# Patient Record
Sex: FEMALE | Race: WHITE | NOT HISPANIC OR LATINO | Employment: OTHER | URBAN - METROPOLITAN AREA
[De-identification: names, ages, dates, MRNs, and addresses within clinical notes are randomized per-mention and may not be internally consistent; named-entity substitution may affect disease eponyms.]

---

## 2022-06-09 ENCOUNTER — EVALUATION (OUTPATIENT)
Dept: PHYSICAL THERAPY | Facility: REHABILITATION | Age: 67
End: 2022-06-09
Payer: MEDICARE

## 2022-06-09 DIAGNOSIS — Z47.1 AFTERCARE FOLLOWING RIGHT KNEE JOINT REPLACEMENT SURGERY: ICD-10-CM

## 2022-06-09 DIAGNOSIS — M25.561 RIGHT KNEE PAIN, UNSPECIFIED CHRONICITY: Primary | ICD-10-CM

## 2022-06-09 DIAGNOSIS — Z96.651 AFTERCARE FOLLOWING RIGHT KNEE JOINT REPLACEMENT SURGERY: ICD-10-CM

## 2022-06-09 PROCEDURE — 97161 PT EVAL LOW COMPLEX 20 MIN: CPT | Performed by: PHYSICAL THERAPIST

## 2022-06-09 PROCEDURE — 97140 MANUAL THERAPY 1/> REGIONS: CPT | Performed by: PHYSICAL THERAPIST

## 2022-06-09 NOTE — PROGRESS NOTES
PT Evaluation     Today's date: 2022  Patient name: Angela Williamson  : 1955  MRN: [id number]  Referring provider: Juan Cohen MD  Dx:   Encounter Diagnosis     ICD-10-CM    1  Aftercare following right knee joint replacement surgery  Z47 1     Z96 651    2  Right knee pain, unspecified chronicity  M25 561                   Assessment  Assessment details: 2022  Pt is a pleasant 75 yo female presenting to physical therapy s/p R TKA on 2020  Pt is PWB utilizing a RW  Pt displays difficulty ambulating as well as STS  Pt displayed good A/PROM in knee flexion and a limitation in A/PROM in knee extension   Pt displayed weakness in R quad  Pt was given exercises to complete at home including heel slides, quad sets, hamstring stretch, heel/toe raises, and squats on a countertop  These exercises were provided in a HEP  Pt was educated on safe STS and displayed knowledge on climbing stairs  Pt's RW was adjusted to properly fit  Pt would benefit from skilled PT to address limitations in pain management, ROM, and Strength, and to achieve goals  Impairments: abnormal coordination, abnormal gait, abnormal muscle firing, abnormal or restricted ROM, activity intolerance, impaired balance, impaired physical strength, lacks appropriate home exercise program, pain with function and weight-bearing intolerance  Understanding of Dx/Px/POC: good   Prognosis: good    Goals  ST  Patient will report 25% decrease in pain with dressing in 4 weeks  2  Patient will demonstrate ROM knee extension of <10 from 0 to normalize gait pattern with SPC in 4 weeks  3  Patient will demonstrate grade 3+ in quad strength in 4 weeks  LT  Patient will be able to perform IADLS without restriction or pain by discharge  2  Patient will be independent in HEP by discharge  3  Patient will be able to return to walking the length of a block independently without assistive device by discharge         Plan  Patient would benefit from: PT eval and skilled physical therapy  Planned modality interventions: cryotherapy and electrical stimulation/Russian stimulation  Planned therapy interventions: ADL retraining, balance/weight bearing training, strengthening, stretching, self care, gait training, functional ROM exercises, flexibility, home exercise program, IADL retraining, manual therapy, neuromuscular re-education, patient education, transfer training, therapeutic training, therapeutic exercise and therapeutic activities  Frequency: 2x week  Duration in visits: 8  Duration in weeks: 4  Plan of Care beginning date: 2022  Plan of Care expiration date: 2022  Treatment plan discussed with: patient        Subjective Evaluation    History of Present Illness  Mechanism of injury: 22  Dylan Muniz is 73 yo female, who presents to physical therapy s/p R TKA on 2022  Pt presents ambulating on RW with bandage strip covering incision  Pt stated she had a "burning stabbing pain post surgery that has gone down in the last day"  Pt states she has had Knee OA which she describes as "bone on bone" and had 5 years of Cortisone injections as well as therapy prior to surgery  Pt lives in a 2 story house with her  where she has avoided using the stairs due to fear of pain  She has 1 step to enter the house by denies difficulty from the past two days  Pt states that her  has been helping her with ADLs  Pt states she is in moderate pain and has had difficulty sleeping due to pain and being unable to rest in a comfortable position  Pt states she will see the PA-C at 3 weeks and surgeon at 6 weeks  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  Denies fever or chills  PMH: hypertension   AGG: changing positions, sit to stand, walking   REL: ice, Tylenol "on the clock"   GOAL: Pt states she would like to reduce her pain to 5-6/10 and be able to go on walks again     Pain  Current pain ratin  At worst pain rating: 10    Patient Goals  Patient goals for therapy: decreased pain, increased motion, increased strength, improved balance, decreased edema, independence with ADLs/IADLs and return to sport/leisure activities          Objective     Active Range of Motion     Right Knee   Flexion: 60 degrees   Extensor la degrees     Passive Range of Motion     Right Knee   Flexion: 90 degrees   Extension: 20 degrees     Strength/Myotome Testing     Left Knee   Extension: 5    Right Knee   Extension: 3-    Tests     Additional Tests Details  22  Unable to assess incision due to post-op bandage intact  To be removed this weekend  Swelling     Right Knee Girth Measurement (cm)   Joint line: 49 5 cm  10 cm above joint line: 57 5 cm  10 cm below joint line: 46 8 cm    Ambulation   Weight-Bearing Status   Weight-Bearing Status (Left): partial weight-bearing   Assistive device used: two-wheeled walker    Additional Weight-Bearing Status Details  2022  Pt presents PWB, with step-to pattern, on RW which was set too high - modified in clinic to appropriate height  Pt ambulates with a forward trunk lean and slow gait  Pt was able to squat shelter while using the support of a countertop  Pt displayed difficulty sit to stand  Pt was knowledgeable on safe stair climbing practices, but displayed confusion with standing from a chair  Pt was able to heel raise with minimal pain/difficulty, with assistance of UEs                 Precautions: HTN    Daily Treatment Diary    Date             FOTO IE            Re-Eval IE               Manuals    PROM flex/ext MR            Patellar mobs             PROM hip                          Neuro Re-Ed                                                                                                Ther Ex                 Mini squats x5            Stand hip abd, ext             HR/TR x5            Standing gastroc stretch             Knee flex/ext stretch on step Quad sets              [employer]                          Ther Activity    bike ROM                         Gait Training                              Modalities    CP PRN

## 2022-06-09 NOTE — LETTER
2022    MD Stacy Norris [phone]    Patient: Mika Ordonez   YOB: 1955   Date of Visit: 2022     Encounter Diagnosis     ICD-10-CM    1  Right knee pain, unspecified chronicity  M25 561    2  Aftercare following right knee joint replacement surgery  Z47 1     Z96 651        Dear Dr Yesy Gordon: Thank you for your recent referral of Mika Ordonez  Please review the attached evaluation summary from Jesika's recent visit  Please verify that you agree with the plan of care by signing the attached order  If you have any questions or concerns, please do not hesitate to call  I sincerely appreciate the opportunity to share in the care of one of your patients and hope to have another opportunity to work with you in the near future  Sincerely,    Tanya Serna, PT      Referring Provider:      I certify that I have read the below Plan of Care and certify the need for these services furnished under this plan of treatment while under my care  MD Stacy Norris [phone]  Via Fax: [phone]          PT Evaluation     Today's date: 2022  Patient name: Mika Ordonez  : 1955  MRN: [id number]  Referring provider: Hattie Schaumann, MD  Dx:   Encounter Diagnosis     ICD-10-CM    1  Aftercare following right knee joint replacement surgery  Z47 1     Z96 651    2  Right knee pain, unspecified chronicity  M25 561                   Assessment  Assessment details: 2022  Pt is a pleasant 75 yo female presenting to physical therapy s/p R TKA on 2020  Pt is PWB utilizing a RW  Pt displays difficulty ambulating as well as STS  Pt displayed good A/PROM in knee flexion and a limitation in A/PROM in knee extension   Pt displayed weakness in R quad   Pt was given exercises to complete at home including heel slides, quad sets, hamstring stretch, heel/toe raises, and squats on a countertop  These exercises were provided in a HEP  Pt was educated on safe STS and displayed knowledge on climbing stairs  Pt's RW was adjusted to properly fit  Pt would benefit from skilled PT to address limitations in pain management, ROM, and Strength, and to achieve goals  Impairments: abnormal coordination, abnormal gait, abnormal muscle firing, abnormal or restricted ROM, activity intolerance, impaired balance, impaired physical strength, lacks appropriate home exercise program, pain with function and weight-bearing intolerance  Understanding of Dx/Px/POC: good   Prognosis: good    Goals  ST  Patient will report 25% decrease in pain with dressing in 4 weeks  2  Patient will demonstrate ROM knee extension of <10 from 0 to normalize gait pattern with SPC in 4 weeks  3  Patient will demonstrate grade 3+ in quad strength in 4 weeks  LT  Patient will be able to perform IADLS without restriction or pain by discharge  2  Patient will be independent in HEP by discharge  3  Patient will be able to return to walking the length of a block independently without assistive device by discharge         Plan  Patient would benefit from: PT eval and skilled physical therapy  Planned modality interventions: cryotherapy and electrical stimulation/Russian stimulation  Planned therapy interventions: ADL retraining, balance/weight bearing training, strengthening, stretching, self care, gait training, functional ROM exercises, flexibility, home exercise program, IADL retraining, manual therapy, neuromuscular re-education, patient education, transfer training, therapeutic training, therapeutic exercise and therapeutic activities  Frequency: 2x week  Duration in visits: 8  Duration in weeks: 4  Plan of Care beginning date: 2022  Plan of Care expiration date: 2022  Treatment plan discussed with: patient        Subjective Evaluation    History of Present Illness  Mechanism of injury: 22  Jennie Cassidy is 73 yo female, who presents to physical therapy s/p R TKA on 2022  Pt presents ambulating on RW with bandage strip covering incision  Pt stated she had a "burning stabbing pain post surgery that has gone down in the last day"  Pt states she has had Knee OA which she describes as "bone on bone" and had 5 years of Cortisone injections as well as therapy prior to surgery  Pt lives in a 2 story house with her  where she has avoided using the stairs due to fear of pain  She has 1 step to enter the house by denies difficulty from the past two days  Pt states that her  has been helping her with ADLs  Pt states she is in moderate pain and has had difficulty sleeping due to pain and being unable to rest in a comfortable position  Pt states she will see the PA-C at 3 weeks and surgeon at 6 weeks  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  Denies fever or chills  PMH: hypertension   AGG: changing positions, sit to stand, walking   REL: ice, Tylenol "on the clock"   GOAL: Pt states she would like to reduce her pain to 5-6/10 and be able to go on walks again  Pain  Current pain ratin  At worst pain rating: 10    Patient Goals  Patient goals for therapy: decreased pain, increased motion, increased strength, improved balance, decreased edema, independence with ADLs/IADLs and return to sport/leisure activities          Objective     Active Range of Motion     Right Knee   Flexion: 60 degrees   Extensor la degrees     Passive Range of Motion     Right Knee   Flexion: 90 degrees   Extension: 20 degrees     Strength/Myotome Testing     Left Knee   Extension: 5    Right Knee   Extension: 3-    Tests     Additional Tests Details  22  Unable to assess incision due to post-op bandage intact  To be removed this weekend       Swelling     Right Knee Girth Measurement (cm)   Joint line: 49 5 cm  10 cm above joint line: 57 5 cm  10 cm below joint line: 46 8 cm    Ambulation Weight-Bearing Status   Weight-Bearing Status (Left): partial weight-bearing   Assistive device used: two-wheeled walker    Additional Weight-Bearing Status Details  6/9/2022  Pt presents PWB, with step-to pattern, on RW which was set too high - modified in clinic to appropriate height  Pt ambulates with a forward trunk lean and slow gait  Pt was able to squat USP while using the support of a countertop  Pt displayed difficulty sit to stand  Pt was knowledgeable on safe stair climbing practices, but displayed confusion with standing from a chair  Pt was able to heel raise with minimal pain/difficulty, with assistance of UEs                 Precautions: HTN    Daily Treatment Diary    Date 6/9            FOTO IE            Re-Eval IE               Manuals    PROM flex/ext MR            Patellar mobs             PROM hip                          Neuro Re-Ed                                                                                                Ther Ex                 Mini squats x5            Stand hip abd, ext             HR/TR x5            Standing gastroc stretch             Knee flex/ext stretch on step                                       Quad sets              Heel slides             Bridges             SAQ                          Ther Activity    bike ROM                         Gait Training                              Modalities    CP PRN

## 2022-06-13 ENCOUNTER — OFFICE VISIT (OUTPATIENT)
Dept: PHYSICAL THERAPY | Facility: REHABILITATION | Age: 67
End: 2022-06-13
Payer: MEDICARE

## 2022-06-13 DIAGNOSIS — M25.561 RIGHT KNEE PAIN, UNSPECIFIED CHRONICITY: Primary | ICD-10-CM

## 2022-06-13 DIAGNOSIS — Z96.651 STATUS POST TOTAL RIGHT KNEE REPLACEMENT: ICD-10-CM

## 2022-06-13 PROCEDURE — 97110 THERAPEUTIC EXERCISES: CPT | Performed by: PHYSICAL THERAPIST

## 2022-06-13 NOTE — PROGRESS NOTES
Daily Note     Today's date: 2022  Patient name: Jazlyn Elizabeth  : 1955  MRN: [id number]  Referring provider: Orlin Flores MD  Dx:   Encounter Diagnosis     ICD-10-CM    1  Right knee pain, unspecified chronicity  M25 561    2  Status post total right knee replacement  Z96 651                   Subjective: Pt presents to therapy today with minimal pain  Pt presents with a 2pRW with a limited step through gait pattern  Pt states she was "doing her exercises at home and felt stiff afterwards"  Objective: See treatment diary below      Assessment: Tolerated treatment well  Pt utilized belt to transfer LLE on/off of bed  Pt displayed difficulty balancing for standing hip abd and ext  Will plan to add balance exercises next treatment  Pt increased PROM in knee flexion and tolerated new exercises well  Patient demonstrated fatigue post treatment, exhibited good technique with therapeutic exercises and would benefit from continued PT      Plan: Progress treatment as tolerated         Precautions: HTN    Daily Treatment Diary    Date            FOTO IE            Re-Eval IE               Manuals    PROM flex/ext MR MR           Patellar mobs             PROM hip                          Neuro Re-Ed     Tandem stance             SLS                                                                              Ther Ex                 Mini squats x5 2x10           Stand hip abd, ext  1x10 ea           HR/TR x5 2x10           Standing gastroc stretch  10"x10           Knee flex/ext stretch on step  10"x10                                     Quad sets   10"x10           Heel slides  10"x10           Bridges             SAQ                          Ther Activity    bike ROM nv                        Gait Training                              Modalities    CP PRN  10min                           Pt was instructed through exercises and manual therapy with ARISTEO Alejandre, under direct supervision of Radha Shah Mitchell, PT, DPT

## 2022-06-15 ENCOUNTER — OFFICE VISIT (OUTPATIENT)
Dept: PHYSICAL THERAPY | Facility: REHABILITATION | Age: 67
End: 2022-06-15
Payer: MEDICARE

## 2022-06-15 DIAGNOSIS — Z47.1 AFTERCARE FOLLOWING RIGHT KNEE JOINT REPLACEMENT SURGERY: ICD-10-CM

## 2022-06-15 DIAGNOSIS — M25.561 RIGHT KNEE PAIN, UNSPECIFIED CHRONICITY: Primary | ICD-10-CM

## 2022-06-15 DIAGNOSIS — Z96.651 STATUS POST TOTAL RIGHT KNEE REPLACEMENT: ICD-10-CM

## 2022-06-15 DIAGNOSIS — Z96.651 AFTERCARE FOLLOWING RIGHT KNEE JOINT REPLACEMENT SURGERY: ICD-10-CM

## 2022-06-15 PROCEDURE — 97110 THERAPEUTIC EXERCISES: CPT | Performed by: PHYSICAL THERAPIST

## 2022-06-15 PROCEDURE — 97140 MANUAL THERAPY 1/> REGIONS: CPT | Performed by: PHYSICAL THERAPIST

## 2022-06-15 NOTE — PROGRESS NOTES
Daily Note     Today's date: 6/15/2022  Patient name: Ivania Matamoros  : 1955  MRN: [id number]  Referring provider: Rossy Lam MD  Dx:   Encounter Diagnosis     ICD-10-CM    1  Right knee pain, unspecified chronicity  M25 561    2  Status post total right knee replacement  Z96 651    3  Aftercare following right knee joint replacement surgery  Z47 1     Z96 651                   Subjective: Pt comes to therapy denying notable discomfort or adverse responses to last therapy session  ambulates into clinic with RW and step-to pattern  Objective: See treatment diary below      Assessment: Tolerated treatment well  Reported challenge with standing exercises  Appropriate stretches with manuals  Patient demonstrated fatigue post treatment, exhibited good technique with therapeutic exercises and would benefit from continued PT      Plan: Progress treatment as tolerated         Precautions: HTN    Daily Treatment Diary    Date 6/9 6/13 6/15          FOTO IE            Re-Eval IE               Manuals    PROM flex/ext MR MR seated TK          Patellar mobs             PROM hip                          Neuro Re-Ed     Tandem stance   nv          SLS                                                                              Ther Ex                 Mini squats x5 2x10 nv          Stand hip abd, ext  1x10 ea 1x10 ea B          HR/TR x5 2x10 x20          Standing gastroc stretch  10"x10 30"x3          Knee flex/ext stretch on step  10"x10 10"x10                                    Quad sets   10"x10           Heel slides  10"x10 Seated 5"x10          Bridges             LAQ   5"x20                                    Ther Activity    bike ROM nv 5 min                       Gait Training                              Modalities    CP PRN  10min  def

## 2022-06-20 ENCOUNTER — OFFICE VISIT (OUTPATIENT)
Dept: PHYSICAL THERAPY | Facility: REHABILITATION | Age: 67
End: 2022-06-20
Payer: MEDICARE

## 2022-06-20 DIAGNOSIS — M25.561 RIGHT KNEE PAIN, UNSPECIFIED CHRONICITY: Primary | ICD-10-CM

## 2022-06-20 DIAGNOSIS — Z96.651 AFTERCARE FOLLOWING RIGHT KNEE JOINT REPLACEMENT SURGERY: ICD-10-CM

## 2022-06-20 DIAGNOSIS — Z96.651 STATUS POST TOTAL RIGHT KNEE REPLACEMENT: ICD-10-CM

## 2022-06-20 DIAGNOSIS — Z47.1 AFTERCARE FOLLOWING RIGHT KNEE JOINT REPLACEMENT SURGERY: ICD-10-CM

## 2022-06-20 PROCEDURE — 97110 THERAPEUTIC EXERCISES: CPT | Performed by: PHYSICAL THERAPIST

## 2022-06-20 PROCEDURE — 97140 MANUAL THERAPY 1/> REGIONS: CPT | Performed by: PHYSICAL THERAPIST

## 2022-06-20 NOTE — PROGRESS NOTES
Daily Note     Today's date: 2022  Patient name: Mee Ho  : 1955  MRN: [id number]  Referring provider: Leon Augustin MD  Dx:   Encounter Diagnosis     ICD-10-CM    1  Right knee pain, unspecified chronicity  M25 561    2  Status post total right knee replacement  Z96 651    3  Aftercare following right knee joint replacement surgery  Z47 1     Z96 651                   Subjective: Pt reports no pain in the knee currently just in the lateral hip and no new issues since last visit  She states she has a f/u with the surgeon next week  Objective: See treatment diary below  Knee AROM: 5-100 deg      Assessment: The patient demonstrated good tolerance to exercises and was appropriately challenged with tandem stance and progressing strengthening exercises  Knee ROM continues to steadily improve  Plan: Progress treatment as tolerated         Precautions: HTN    Daily Treatment Diary    Date 6/9 6/13 6/15 6/20         FOTO IE            Re-Eval IE               Manuals    PROM flex/ext MR MR seated TK Seated NB         Patellar mobs             PROM hip                          Neuro Re-Ed     Tandem stance   nv 20"x3 ea         SLS                                                                              Ther Ex                 Mini squats x5 2x10 nv 2x10         Stand hip abd, ext  1x10 ea 1x10 ea B 1x12 ea         HR/TR x5 2x10 x20 x20         Standing gastroc stretch  10"x10 30"x3 30"x3         Knee flex/ext stretch on step  10"x10 10"x10 10"x10                                   Quad sets   10"x10  10"x10         Heel slides  10"x10 Seated 5"x10 Seated 5"x15         Bridges             LAQ   5"x20 5"x25                                   Ther Activity    bike ROM nv 5 min 5 min                      Gait Training                              Modalities    CP PRN  10min  def def

## 2022-06-22 ENCOUNTER — OFFICE VISIT (OUTPATIENT)
Dept: PHYSICAL THERAPY | Facility: REHABILITATION | Age: 67
End: 2022-06-22
Payer: MEDICARE

## 2022-06-22 DIAGNOSIS — Z47.1 AFTERCARE FOLLOWING RIGHT KNEE JOINT REPLACEMENT SURGERY: ICD-10-CM

## 2022-06-22 DIAGNOSIS — Z96.651 AFTERCARE FOLLOWING RIGHT KNEE JOINT REPLACEMENT SURGERY: ICD-10-CM

## 2022-06-22 DIAGNOSIS — M25.561 RIGHT KNEE PAIN, UNSPECIFIED CHRONICITY: Primary | ICD-10-CM

## 2022-06-22 DIAGNOSIS — Z96.651 STATUS POST TOTAL RIGHT KNEE REPLACEMENT: ICD-10-CM

## 2022-06-22 PROCEDURE — 97140 MANUAL THERAPY 1/> REGIONS: CPT

## 2022-06-22 PROCEDURE — 97110 THERAPEUTIC EXERCISES: CPT

## 2022-06-22 NOTE — PROGRESS NOTES
Daily Note     Today's date: 2022  Patient name: Cely Scott  : 1955  MRN: [id number]  Referring provider: Daxa Enriquez MD  Dx:   Encounter Diagnosis     ICD-10-CM    1  Right knee pain, unspecified chronicity  M25 561    2  Status post total right knee replacement  Z96 651    3  Aftercare following right knee joint replacement surgery  Z47 1     Z96 651                   Subjective: Pt reports she has an achy pain from the back of her knee to her hip this morning  Objective: See treatment diary below      Assessment: Pt with fair tolerance to treatment  Experiences some L HS cramping with standing TE, relieved with rest and some massage  Fair quad contraction with QS, however pt with difficulty maintaining contraction for full 10s  Pt with progressing flex and ext PROM  Appropriate challenge and fatigue noted with exercise completion  Continue to progress as tolerated  Pt would benefit from continued skilled PT to improve R LE ROM, strength and tolerance to functional activity  Plan: Progress treatment as tolerated         Precautions: HTN    Daily Treatment Diary    Date 6/9 6/13 6/15 6/20 6/22        FOTO IE            Re-Eval IE               Manuals    PROM flex/ext MR MR seated TK Seated NB Seated  SC        Patellar mobs             PROM hip                          Neuro Re-Ed     Tandem stance   nv 20"x3 ea 20"x3 ea   Partial-full tandem        SLS                                                                              Ther Ex                 Mini squats x5 2x10 nv 2x10 2x10        Stand hip abd, ext  1x10 ea 1x10 ea B 1x12 ea 1x12 ea        HR/TR x5 2x10 x20 x20 x20        Standing gastroc stretch  10"x10 30"x3 30"x3 manual        Knee flex/ext stretch on step  10"x10 10"x10 10"x10 10"x7                                  Quad sets   10"x10  10"x10 10"x10        Heel slides  10"x10 Seated 5"x10 Seated 5"x15 Seated 5"x15        Bridges             LAQ   5"x20 5"x25 5"x25 Ther Activity    bike ROM nv 5 min 5 min 5 min                     Gait Training                              Modalities    CP PRN  10min  def def def

## 2022-06-27 ENCOUNTER — OFFICE VISIT (OUTPATIENT)
Dept: PHYSICAL THERAPY | Facility: REHABILITATION | Age: 67
End: 2022-06-27
Payer: MEDICARE

## 2022-06-27 DIAGNOSIS — Z96.651 AFTERCARE FOLLOWING RIGHT KNEE JOINT REPLACEMENT SURGERY: ICD-10-CM

## 2022-06-27 DIAGNOSIS — Z47.1 AFTERCARE FOLLOWING RIGHT KNEE JOINT REPLACEMENT SURGERY: ICD-10-CM

## 2022-06-27 DIAGNOSIS — M25.561 RIGHT KNEE PAIN, UNSPECIFIED CHRONICITY: Primary | ICD-10-CM

## 2022-06-27 DIAGNOSIS — Z96.651 STATUS POST TOTAL RIGHT KNEE REPLACEMENT: ICD-10-CM

## 2022-06-27 PROCEDURE — 97110 THERAPEUTIC EXERCISES: CPT

## 2022-06-27 PROCEDURE — 97140 MANUAL THERAPY 1/> REGIONS: CPT

## 2022-06-27 NOTE — PROGRESS NOTES
Daily Note     Today's date: 2022  Patient name: Cely Scott  : 1955  MRN: [id number]  Referring provider: Daxa Enriquez MD  Dx:   Encounter Diagnosis     ICD-10-CM    1  Right knee pain, unspecified chronicity  M25 561    2  Status post total right knee replacement  Z96 651    3  Aftercare following right knee joint replacement surgery  Z47 1     Z96 651                   Subjective: pt reports her hip continues to bother her and has bene experiencing cramping in her hamstring as well  Denied adverse reaction following last visit  Objective: See treatment diary below      Assessment: Tolerated treatment well and without complaints  PROM progressing nicely with greatest restrictions in flexion  Patient demonstrated fatigue post treatment, exhibited good technique with therapeutic exercises and would benefit from continued PT      Plan: Continue per plan of care  Progress treatment as tolerated         Precautions: HTN    Daily Treatment Diary    Date 6/9 6/13 6/15 6/20 6/22 6/27       FOTO IE            Re-Eval IE               Manuals    PROM flex/ext MR MR seated TK Seated NB Seated  SC Seated  TE       Patellar mobs             PROM hip                          Neuro Re-Ed     Tandem stance   nv 20"x3 ea 20"x3 ea   Partial-full tandem nv       SLS                                                                              Ther Ex                 Mini squats x5 2x10 nv 2x10 2x10 2x10       Stand hip abd, ext  1x10 ea 1x10 ea B 1x12 ea 1x12 ea 1x10       HR/TR x5 2x10 x20 x20 x20 x20       Standing gastroc stretch  10"x10 30"x3 30"x3 manual manual       Knee flex/ext stretch on step  10"x10 10"x10 10"x10 10"x7 10"x10                                 Quad sets   10"x10  10"x10 10"x10 5"x25       Heel slides  10"x10 Seated 5"x10 Seated 5"x15 Seated 5"x15 Seated 5"x15       Bridges             LAQ   5"x20 5"x25 5"x25 5"x25                                 Ther Activity    bike ROM nv 5 min 5 min 5 min 5 min                    Gait Training                              Modalities    CP PRN  10min  def def def

## 2022-06-29 ENCOUNTER — OFFICE VISIT (OUTPATIENT)
Dept: PHYSICAL THERAPY | Facility: REHABILITATION | Age: 67
End: 2022-06-29
Payer: MEDICARE

## 2022-06-29 DIAGNOSIS — Z96.651 STATUS POST TOTAL RIGHT KNEE REPLACEMENT: Primary | ICD-10-CM

## 2022-06-29 PROCEDURE — 97110 THERAPEUTIC EXERCISES: CPT | Performed by: PHYSICAL THERAPIST

## 2022-06-29 PROCEDURE — 97140 MANUAL THERAPY 1/> REGIONS: CPT | Performed by: PHYSICAL THERAPIST

## 2022-06-29 NOTE — PROGRESS NOTES
Daily Note     Today's date: 2022  Patient name: Soumya Lua  : 1955  MRN: [id number]  Referring provider: Maire Lesch, MD  Dx:   Encounter Diagnosis     ICD-10-CM    1  Status post total right knee replacement  Z96 651                   Subjective: Pt presents to therapy with minimal pain  Pt states yesterday she had a visit with the surgeon which went very well  Pt ambulates with 2 wheel walker with step through gait  Incision looks clean and dry  Pt also stated she walked a total of 6 miles yesterday  Objective: See treatment diary below      Assessment: Tolerated treatment well  Seated knee flex PROM limited due to table set up so achieved PROM flexion supine  Difficulty with maintaining quad set for 10 sec  Heel slides changed to supine to increase range  Patient demonstrated fatigue post treatment, exhibited good technique with therapeutic exercises and would benefit from continued PT      Plan: Progress treatment as tolerated         Precautions: HTN    Daily Treatment Diary    Date 6/9 6/13 6/15 6/20 6/22 6/27 6/29      FOTO IE            Re-Eval IE               Manuals    PROM flex/ext MR MR seated TK Seated NB Seated  SC Seated  TE MR seated/supine       Patellar mobs             PROM hip                          Neuro Re-Ed     Tandem stance   nv 20"x3 ea 20"x3 ea   Partial-full tandem nv 20"x3 ea full tandem      SLS                                                                              Ther Ex                 Mini squats x5 2x10 nv 2x10 2x10 2x10 2x10       Stand hip abd, ext  1x10 ea 1x10 ea B 1x12 ea 1x12 ea 1x10 1x10 ea       HR/TR x5 2x10 x20 x20 x20 x20 x20       Standing gastroc stretch  10"x10 30"x3 30"x3 manual manual 30"x3   Supine      Knee flex/ext stretch on step  10"x10 10"x10 10"x10 10"x7 10"x10 10"x10                                Quad sets   10"x10  10"x10 10"x10 5"x25 10"x10      Heel slides  10"x10 Seated 5"x10 Seated 5"x15 Seated 5"x15 Seated 5"x15 10"x10 supine       Bridges             LAQ   5"x20 5"x25 5"x25 5"x25 5"x25                                 Ther Activity    bike ROM nv 5 min 5 min 5 min 5 min 5 min                    Gait Training                              Modalities    CP PRN  10min  def def def  def                         Pt was instructed through exercises and manual therapy with Bernice Martinez, SPT, under direct supervision of Anastasiia Hawk, PT, DPT

## 2022-07-06 ENCOUNTER — OFFICE VISIT (OUTPATIENT)
Dept: PHYSICAL THERAPY | Facility: REHABILITATION | Age: 67
End: 2022-07-06
Payer: MEDICARE

## 2022-07-06 DIAGNOSIS — Z47.1 AFTERCARE FOLLOWING RIGHT KNEE JOINT REPLACEMENT SURGERY: ICD-10-CM

## 2022-07-06 DIAGNOSIS — Z96.651 AFTERCARE FOLLOWING RIGHT KNEE JOINT REPLACEMENT SURGERY: ICD-10-CM

## 2022-07-06 DIAGNOSIS — M25.561 RIGHT KNEE PAIN, UNSPECIFIED CHRONICITY: ICD-10-CM

## 2022-07-06 DIAGNOSIS — Z96.651 STATUS POST TOTAL RIGHT KNEE REPLACEMENT: Primary | ICD-10-CM

## 2022-07-06 PROCEDURE — 97140 MANUAL THERAPY 1/> REGIONS: CPT

## 2022-07-06 PROCEDURE — 97110 THERAPEUTIC EXERCISES: CPT

## 2022-07-06 NOTE — PROGRESS NOTES
Daily Note     Today's date: 2022  Patient name: Rose Mary Malcolm  : 1955  MRN: [id number]  Referring provider: Isa Madrid MD  Dx: No diagnosis found  Subjective: pt presents to therapy with RW reporting no adverse reactions and continued compliance with HEP  Objective: See treatment diary below      Assessment: Tolerated treatment well and without complaints  Good response with exercises  Some guarding present with PROM, but appears to be progressing nicely  Gait training performed with SPC around clinic with visual and verbal cues to follow through initially, but overall good carry over  Pt exhibited lack of confidence with ambulating with SPC, therefore, instructed will continue practice in clinic at present time  Patient demonstrated fatigue post treatment, exhibited good technique with therapeutic exercises and would benefit from continued PT      Plan: Continue per plan of care  Progress treatment as tolerated         Precautions: HTN    Daily Treatment Diary    Date 6/9 6/13 6/15 6/20 6/22 6/27 6/29 7/6     FOTO IE            Re-Eval IE               Manuals    PROM flex/ext MR MR seated TK Seated NB Seated  SC Seated  TE MR seated/supine  Supine TE     Patellar mobs             PROM hip                          Neuro Re-Ed     Tandem stance   nv 20"x3 ea 20"x3 ea   Partial-full tandem nv 20"x3 ea full tandem missed     SLS                                                                              Ther Ex                 Mini squats x5 2x10 nv 2x10 2x10 2x10 2x10  2x10     Stand hip abd, ext  1x10 ea 1x10 ea B 1x12 ea 1x12 ea 1x10 1x10 ea  2x10     HR/TR x5 2x10 x20 x20 x20 x20 x20  x30     Standing gastroc stretch  10"x10 30"x3 30"x3 manual manual 30"x3   Supine 30"x3   Supine     Knee flex/ext stretch on step  10"x10 10"x10 10"x10 10"x7 10"x10 10"x10 10"x10                               Quad sets   10"x10  10"x10 10"x10 5"x25 10"x10 10"x10     Heel slides  10"x10 Seated 5"x10 Seated 5"x15 Seated 5"x15 Seated 5"x15 10"x10 supine  Seated 10"x10     Bridges             LAQ   5"x20 5"x25 5"x25 5"x25 5"x25  5"x30                               Ther Activity    bike ROM nv 5 min 5 min 5 min 5 min 5 min  5 min                  Gait Training            /c SPC x30ft                  Modalities    CP PRN  10min  def def def  def def

## 2022-07-08 ENCOUNTER — OFFICE VISIT (OUTPATIENT)
Dept: PHYSICAL THERAPY | Facility: REHABILITATION | Age: 67
End: 2022-07-08
Payer: MEDICARE

## 2022-07-08 DIAGNOSIS — Z96.651 AFTERCARE FOLLOWING RIGHT KNEE JOINT REPLACEMENT SURGERY: ICD-10-CM

## 2022-07-08 DIAGNOSIS — M25.561 RIGHT KNEE PAIN, UNSPECIFIED CHRONICITY: ICD-10-CM

## 2022-07-08 DIAGNOSIS — Z96.651 STATUS POST TOTAL RIGHT KNEE REPLACEMENT: Primary | ICD-10-CM

## 2022-07-08 DIAGNOSIS — Z47.1 AFTERCARE FOLLOWING RIGHT KNEE JOINT REPLACEMENT SURGERY: ICD-10-CM

## 2022-07-08 PROCEDURE — 97112 NEUROMUSCULAR REEDUCATION: CPT | Performed by: PHYSICAL THERAPIST

## 2022-07-08 PROCEDURE — 97110 THERAPEUTIC EXERCISES: CPT | Performed by: PHYSICAL THERAPIST

## 2022-07-08 PROCEDURE — 97140 MANUAL THERAPY 1/> REGIONS: CPT | Performed by: PHYSICAL THERAPIST

## 2022-07-08 NOTE — PROGRESS NOTES
Daily Note     Today's date: 2022  Patient name: Cierra Randolph  : 1955  MRN: [id number]  Referring provider: Abril Christie MD  Dx:   Encounter Diagnosis     ICD-10-CM    1  Status post total right knee replacement  Z96 651    2  Right knee pain, unspecified chronicity  M25 561    3  Aftercare following right knee joint replacement surgery  Z47 1     Z96 651                   Subjective: Pt with no new complaint since last session  Objective: See treatment diary below  Assessment: Pt with good tolerance to progression of program with addition of resistance with LAQ, with appropriate challenge  Gait training performed with SPC around clinic with moderate verbal cues required initially, improved with repetition and able to demonstrate independently by conclusion  Advised her to continue practicing with use of counter for R UE support at home  Will continue to progress program as able  Pt will benefit from continued skilled PT intervention in order to address her remaining limitations and to restore maximal function  Plan: Continue per plan of care  Progress treatment as tolerated         Precautions: HTN    Daily Treatment Diary    Date 6/9 6/13 6/15 6/20 6/22 6/27 6/29 7/6 7/8    FOTO IE            Re-Eval IE               Manuals    PROM flex/ext MR MR seated TK Seated NB Seated  SC Seated  TE MR seated/supine  Supine TE MD    Patellar mobs             PROM hip                          Neuro Re-Ed     Tandem stance   nv 20"x3 ea 20"x3 ea   Partial-full tandem nv 20"x3 ea full tandem missed     SLS                                                                              Ther Ex                 Mini squats x5 2x10 nv 2x10 2x10 2x10 2x10  2x10 2x10    Stand hip abd, ext  1x10 ea 1x10 ea B 1x12 ea 1x12 ea 1x10 1x10 ea  2x10 2x10 ea    HR/TR x5 2x10 x20 x20 x20 x20 x20  x30 x30    Standing gastroc stretch  10"x10 30"x3 30"x3 manual manual 30"x3   Supine 30"x3   Supine Standing  30"x3    Knee flex/ext stretch on step  10"x10 10"x10 10"x10 10"x7 10"x10 10"x10 10"x10 10"x10                              Quad sets   10"x10  10"x10 10"x10 5"x25 10"x10 10"x10 10"x10    Heel slides  10"x10 Seated 5"x10 Seated 5"x15 Seated 5"x15 Seated 5"x15 10"x10 supine  Seated 10"x10 Seated 10"x10    Bridges             LAQ   5"x20 5"x25 5"x25 5"x25 5"x25  5"x30 1 5#  5"  2x10                              Ther Activity    bike ROM nv 5 min 5 min 5 min 5 min 5 min  5 min 5 min                 Gait Training            /c SPC x30ft SPC  30ft x 2   Close S  VCs                 Modalities    CP PRN  10min  def def def  def def

## 2022-07-11 ENCOUNTER — EVALUATION (OUTPATIENT)
Dept: PHYSICAL THERAPY | Facility: REHABILITATION | Age: 67
End: 2022-07-11
Payer: MEDICARE

## 2022-07-11 DIAGNOSIS — Z96.651 STATUS POST TOTAL RIGHT KNEE REPLACEMENT: Primary | ICD-10-CM

## 2022-07-11 PROCEDURE — 97164 PT RE-EVAL EST PLAN CARE: CPT | Performed by: PHYSICAL THERAPIST

## 2022-07-11 PROCEDURE — 97110 THERAPEUTIC EXERCISES: CPT | Performed by: PHYSICAL THERAPIST

## 2022-07-11 NOTE — PROGRESS NOTES
PT Evaluation     Today's date: 2022  Patient name: Giovani Leo  : 1955  MRN: [id number]  Referring provider: Kennedy Dos Santos MD  Dx:   Encounter Diagnosis     ICD-10-CM    1  Status post total right knee replacement  Z96 651                   Assessment  Assessment details: 2022  Pt is a pleasant 75 yo female presenting to physical therapy s/p R TKA on 2020  Pt is PWB utilizing a RW  Pt displays difficulty ambulating as well as STS  Pt displayed good A/PROM in knee flexion and a limitation in A/PROM in knee extension   Pt displayed weakness in R quad  Pt was given exercises to complete at home including heel slides, quad sets, hamstring stretch, heel/toe raises, and squats on a countertop  These exercises were provided in a HEP  Pt was educated on safe STS and displayed knowledge on climbing stairs  Pt's RW was adjusted to properly fit  Pt would benefit from skilled PT to address limitations in pain management, ROM, and Strength, and to achieve goals  2022  Pt continues to improve overall R knee strength and ROM  Pt has also been able to ambulate independently only using assistive device when needed  Pt still has limited ROM in R knee flexion and extension as well as strength compared to L knee  Edema has decreased  Pt received updated HEP  Pt would benefit from continued outpatient physical therapy and would benefit from skilled physical therapy to address impairments in ROM and strength in the R Knee  Impairments: abnormal coordination, abnormal gait, abnormal muscle firing, abnormal or restricted ROM, activity intolerance, impaired balance, impaired physical strength, lacks appropriate home exercise program, pain with function and weight-bearing intolerance  Understanding of Dx/Px/POC: good   Prognosis: good    Goals  ST  Patient will report 25% decrease in pain with dressing in 4 weeks  MET  2   Patient will demonstrate ROM knee extension of <10 from 0 to normalize gait pattern with SPC in 4 weeks  PARTIALLY MET (PROM: 8/ AROM: 15)   3  Patient will demonstrate grade 3+ in quad strength in 4 weeks  MET (grade 4)     LT  Patient will be able to perform IADLS without restriction or pain by discharge  2  Patient will be independent in HEP by discharge  3  Patient will be able to return to walking the length of a block independently without assistive device by discharge  Plan  Patient would benefit from: PT eval and skilled physical therapy  Planned modality interventions: cryotherapy and electrical stimulation/Russian stimulation  Planned therapy interventions: ADL retraining, balance/weight bearing training, strengthening, stretching, self care, gait training, functional ROM exercises, flexibility, home exercise program, IADL retraining, manual therapy, neuromuscular re-education, patient education, transfer training, therapeutic training, therapeutic exercise and therapeutic activities  Frequency: 2x week  Duration in visits: 8  Duration in weeks: 4  Treatment plan discussed with: patient        Subjective Evaluation    History of Present Illness  Mechanism of injury: 22  Bobby Sanchez is 75 yo female, who presents to physical therapy s/p R TKA on 2022  Pt presents ambulating on RW with bandage strip covering incision  Pt stated she had a "burning stabbing pain post surgery that has gone down in the last day"  Pt states she has had Knee OA which she describes as "bone on bone" and had 5 years of Cortisone injections as well as therapy prior to surgery  Pt lives in a 2 story house with her  where she has avoided using the stairs due to fear of pain  She has 1 step to enter the house by denies difficulty from the past two days  Pt states that her  has been helping her with ADLs  Pt states she is in moderate pain and has had difficulty sleeping due to pain and being unable to rest in a comfortable position   Pt states she will see the PA-C at 3 weeks and surgeon at 6 weeks  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  Denies fever or chills  PMH: hypertension   AGG: changing positions, sit to stand, walking   REL: ice, Tylenol "on the clock"   GOAL: Pt states she would like to reduce her pain to 5-6/10 and be able to go on walks again  2022  Pt presents to therapy with minimal pain  Pt states she believes she has achieved 70% of her goals in therapy  Pt states she feels less pain than at IE, but still experiences aching sensation  Pt states she is IADLs with minimal pain which includes showering and dressing  Pt wants to continuing strengthening to be able to walk a path at her local park at a faster speed than she is currently walking  Pt ambulates in therapy with a RW, but states she ambulates without an assistive device at home and will utilize walls/furniture to help stabilize as per needed   Quality of life: good    Pain  Current pain ratin  At best pain rating: 3  At worst pain ratin    Patient Goals  Patient goals for therapy: decreased pain, increased motion, increased strength, improved balance, decreased edema, independence with ADLs/IADLs and return to sport/leisure activities          Objective     Active Range of Motion     Right Knee   Flexion: 105 degrees   Extension: 15 degrees     Passive Range of Motion     Right Knee   Flexion: 111 degrees   Extension: 8 degrees     Strength/Myotome Testing     Left Knee   Extension: 5    Right Knee   Extension: 4    Swelling     Right Knee Girth Measurement (cm)   Joint line: 45 7 cm  10 cm above joint line: 55 cm  10 cm below joint line: 42 5 cm    Ambulation   Weight-Bearing Status   Weight-Bearing Status (Left): partial weight-bearing   Assistive device used: two-wheeled walker    Additional Weight-Bearing Status Details  2022  Pt presents PWB, with step-to pattern, on RW which was set too high - modified in clinic to appropriate height   Pt ambulates with a forward trunk lean and slow gait  Pt was able to squat senior care while using the support of a countertop  Pt displayed difficulty sit to stand  Pt was knowledgeable on safe stair climbing practices, but displayed confusion with standing from a chair  Pt was able to heel raise with minimal pain/difficulty, with assistance of UEs                 Precautions: HTN    Daily Treatment Diary    Date 6/9 6/13 6/15 6/20 6/22 6/27 6/29 7/6 7/8 7/11   FOTO IE            Re-Eval IE               Manuals    PROM flex/ext MR MR seated TK Seated NB Seated  SC Seated  TE MR seated/supine  Supine TE MD    Patellar mobs             PROM hip                          Neuro Re-Ed     Tandem stance   nv 20"x3 ea 20"x3 ea   Partial-full tandem nv 20"x3 ea full tandem missed     SLS          nv                                                                    Ther Ex                 Mini squats x5 2x10 nv 2x10 2x10 2x10 2x10  2x10 2x10 2x10   Stand hip abd, ext  1x10 ea 1x10 ea B 1x12 ea 1x12 ea 1x10 1x10 ea  2x10 2x10 ea 1 5#  2x10 ea   HR/TR x5 2x10 x20 x20 x20 x20 x20  x30 x30 x30    Standing gastroc stretch  10"x10 30"x3 30"x3 manual manual 30"x3   Supine 30"x3   Supine Standing  30"x3 standing 30"x3   Knee flex/ext stretch on step  10"x10 10"x10 10"x10 10"x7 10"x10 10"x10 10"x10 10"x10 10"x10   Prone quad stretch           30"x3   Prone hang           1#  2'x2   Quad sets   10"x10  10"x10 10"x10 5"x25 10"x10 10"x10 10"x10 HEP   Heel slides  10"x10 Seated 5"x10 Seated 5"x15 Seated 5"x15 Seated 5"x15 10"x10 supine  Seated 10"x10 Seated 10"x10 Supine  10"x10    Bridges             LAQ   5"x20 5"x25 5"x25 5"x25 5"x25  5"x30 1 5#  5"  2x10 1 5#  5" 2x10                             Ther Activity    bike ROM nv 5 min 5 min 5 min 5 min 5 min  5 min 5 min 5 min                 Gait Training            /c SPC x30ft SPC  30ft x 2   Close S  VCs D/C                Modalities    CP PRN  10min  def def def  def def                  Re-evaluation was performed and pt was instructed through exercises by Lara Ramsey, SPT, under direct supervision of Lulu Badillo, PT, DPT

## 2022-07-11 NOTE — LETTER
2022    Darlene Ramos MD  [address] [address] [id number]    Patient: Hamilton Tomlinson   YOB: 1955   Date of Visit: 2022     Encounter Diagnosis     ICD-10-CM    1  Status post total right knee replacement  Z96 651        Dear Dr Abbey Kumar: Thank you for your recent referral of Hamilton Tomlinson  Please review the attached evaluation summary from Jesika's recent visit  Please verify that you agree with the plan of care by signing the attached order  If you have any questions or concerns, please do not hesitate to call  I sincerely appreciate the opportunity to share in the care of one of your patients and hope to have another opportunity to work with you in the near future  Sincerely,    Roberto Hartley, PT      Referring Provider:      I certify that I have read the below Plan of Care and certify the need for these services furnished under this plan of treatment while under my care  MD Adelita Tavares [facility] [phone]  [facility] [id number]  Via Fax: [phone]          PT Evaluation     Today's date: 2022  Patient name: Hamilton Tomlinson  : 1955  MRN: [id number]  Referring provider: Mildred Bush MD  Dx:   Encounter Diagnosis     ICD-10-CM    1  Status post total right knee replacement  Z96 651                   Assessment  Assessment details: 2022  Pt is a pleasant 73 yo female presenting to physical therapy s/p R TKA on 2020  Pt is PWB utilizing a RW  Pt displays difficulty ambulating as well as STS  Pt displayed good A/PROM in knee flexion and a limitation in A/PROM in knee extension   Pt displayed weakness in R quad  Pt was given exercises to complete at home including heel slides, quad sets, hamstring stretch, heel/toe raises, and squats on a countertop  These exercises were provided in a HEP  Pt was educated on safe STS and displayed knowledge on climbing stairs   Pt's RW was adjusted to properly fit  Pt would benefit from skilled PT to address limitations in pain management, ROM, and Strength, and to achieve goals  2022  Pt continues to improve overall R knee strength and ROM  Pt has also been able to ambulate independently only using assistive device when needed  Pt still has limited ROM in R knee flexion and extension as well as strength compared to L knee  Edema has decreased  Pt received updated HEP  Pt would benefit from continued outpatient physical therapy and would benefit from skilled physical therapy to address impairments in ROM and strength in the R Knee  Impairments: abnormal coordination, abnormal gait, abnormal muscle firing, abnormal or restricted ROM, activity intolerance, impaired balance, impaired physical strength, lacks appropriate home exercise program, pain with function and weight-bearing intolerance  Understanding of Dx/Px/POC: good   Prognosis: good    Goals  ST  Patient will report 25% decrease in pain with dressing in 4 weeks  MET  2  Patient will demonstrate ROM knee extension of <10 from 0 to normalize gait pattern with SPC in 4 weeks  PARTIALLY MET (PROM: 8/ AROM: 15)   3  Patient will demonstrate grade 3+ in quad strength in 4 weeks  MET (grade 4)     LT  Patient will be able to perform IADLS without restriction or pain by discharge  2  Patient will be independent in HEP by discharge  3  Patient will be able to return to walking the length of a block independently without assistive device by discharge         Plan  Patient would benefit from: PT eval and skilled physical therapy  Planned modality interventions: cryotherapy and electrical stimulation/Russian stimulation  Planned therapy interventions: ADL retraining, balance/weight bearing training, strengthening, stretching, self care, gait training, functional ROM exercises, flexibility, home exercise program, IADL retraining, manual therapy, neuromuscular re-education, patient education, transfer training, therapeutic training, therapeutic exercise and therapeutic activities  Frequency: 2x week  Duration in visits: 8  Duration in weeks: 4  Treatment plan discussed with: patient        Subjective Evaluation    History of Present Illness  Mechanism of injury: 06/09/22  Imelda Granda is 75 yo female, who presents to physical therapy s/p R TKA on 6/6/2022  Pt presents ambulating on RW with bandage strip covering incision  Pt stated she had a "burning stabbing pain post surgery that has gone down in the last day"  Pt states she has had Knee OA which she describes as "bone on bone" and had 5 years of Cortisone injections as well as therapy prior to surgery  Pt lives in a 2 story house with her  where she has avoided using the stairs due to fear of pain  She has 1 step to enter the house by denies difficulty from the past two days  Pt states that her  has been helping her with ADLs  Pt states she is in moderate pain and has had difficulty sleeping due to pain and being unable to rest in a comfortable position  Pt states she will see the PA-C at 3 weeks and surgeon at 6 weeks  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  Denies fever or chills  PMH: hypertension   AGG: changing positions, sit to stand, walking   REL: ice, Tylenol "on the clock"   GOAL: Pt states she would like to reduce her pain to 5-6/10 and be able to go on walks again  7/11/2022  Pt presents to therapy with minimal pain  Pt states she believes she has achieved 70% of her goals in therapy  Pt states she feels less pain than at IE, but still experiences aching sensation  Pt states she is IADLs with minimal pain which includes showering and dressing  Pt wants to continuing strengthening to be able to walk a path at her local park at a faster speed than she is currently walking   Pt ambulates in therapy with a RW, but states she ambulates without an assistive device at home and will utilize walls/furniture to help stabilize as per needed   Quality of life: good    Pain  Current pain ratin  At best pain rating: 3  At worst pain ratin    Patient Goals  Patient goals for therapy: decreased pain, increased motion, increased strength, improved balance, decreased edema, independence with ADLs/IADLs and return to sport/leisure activities          Objective     Active Range of Motion     Right Knee   Flexion: 105 degrees   Extension: 15 degrees     Passive Range of Motion     Right Knee   Flexion: 111 degrees   Extension: 8 degrees     Strength/Myotome Testing     Left Knee   Extension: 5    Right Knee   Extension: 4    Swelling     Right Knee Girth Measurement (cm)   Joint line: 45 7 cm  10 cm above joint line: 55 cm  10 cm below joint line: 42 5 cm    Ambulation   Weight-Bearing Status   Weight-Bearing Status (Left): partial weight-bearing   Assistive device used: two-wheeled walker    Additional Weight-Bearing Status Details  2022  Pt presents PWB, with step-to pattern, on RW which was set too high - modified in clinic to appropriate height  Pt ambulates with a forward trunk lean and slow gait  Pt was able to squat long-term while using the support of a countertop  Pt displayed difficulty sit to stand  Pt was knowledgeable on safe stair climbing practices, but displayed confusion with standing from a chair  Pt was able to heel raise with minimal pain/difficulty, with assistance of UEs                 Precautions: HTN    Daily Treatment Diary    Date 6/9 6/13 6/15 6/20 6/22 6/27 6/29 7/6 7   FOTO IE            Re-Eval IE               Manuals    PROM flex/ext MR MR seated TK Seated NB Seated  SC Seated  TE MR seated/supine  Supine TE MD    Patellar mobs             PROM hip                          Neuro Re-Ed     Tandem stance   nv 20"x3 ea 20"x3 ea   Partial-full tandem nv 20"x3 ea full tandem missed     SLS          nv                                                                    Ther Ex Mini squats x5 2x10 nv 2x10 2x10 2x10 2x10  2x10 2x10 2x10   Stand hip abd, ext  1x10 ea 1x10 ea B 1x12 ea 1x12 ea 1x10 1x10 ea  2x10 2x10 ea 1 5#  2x10 ea   HR/TR x5 2x10 x20 x20 x20 x20 x20  x30 x30 x30    Standing gastroc stretch  10"x10 30"x3 30"x3 manual manual 30"x3   Supine 30"x3   Supine Standing  30"x3 standing 30"x3   Knee flex/ext stretch on step  10"x10 10"x10 10"x10 10"x7 10"x10 10"x10 10"x10 10"x10 10"x10   Prone quad stretch           30"x3   Prone hang           1#  2'x2   Quad sets   10"x10  10"x10 10"x10 5"x25 10"x10 10"x10 10"x10 HEP   Heel slides  10"x10 Seated 5"x10 Seated 5"x15 Seated 5"x15 Seated 5"x15 10"x10 supine  Seated 10"x10 Seated 10"x10 Supine  10"x10    Bridges             LAQ   5"x20 5"x25 5"x25 5"x25 5"x25  5"x30 1 5#  5"  2x10 1 5#  5" 2x10                             Ther Activity    bike ROM nv 5 min 5 min 5 min 5 min 5 min  5 min 5 min 5 min                 Gait Training            /c SPC x30ft SPC  30ft x 2   Close S  VCs D/C                Modalities    CP PRN  10min  def def def  def def                  Re-evaluation was performed and pt was instructed through exercises by ARISTEO Mchugh, under direct supervision of Arnie London PT, DPT  Attestation signed by Arnie London PT at 7/11/2022 11:08 AM:  I supervised the visit  We discussed the case to ensure appropriate continuation and progression of care and I reviewed the documentation

## 2022-07-13 ENCOUNTER — OFFICE VISIT (OUTPATIENT)
Dept: PHYSICAL THERAPY | Facility: REHABILITATION | Age: 67
End: 2022-07-13
Payer: MEDICARE

## 2022-07-13 DIAGNOSIS — M25.561 RIGHT KNEE PAIN, UNSPECIFIED CHRONICITY: ICD-10-CM

## 2022-07-13 DIAGNOSIS — Z47.1 AFTERCARE FOLLOWING RIGHT KNEE JOINT REPLACEMENT SURGERY: ICD-10-CM

## 2022-07-13 DIAGNOSIS — Z96.651 STATUS POST TOTAL RIGHT KNEE REPLACEMENT: Primary | ICD-10-CM

## 2022-07-13 DIAGNOSIS — Z96.651 AFTERCARE FOLLOWING RIGHT KNEE JOINT REPLACEMENT SURGERY: ICD-10-CM

## 2022-07-13 PROCEDURE — 97140 MANUAL THERAPY 1/> REGIONS: CPT | Performed by: PHYSICAL THERAPIST

## 2022-07-13 PROCEDURE — 97110 THERAPEUTIC EXERCISES: CPT | Performed by: PHYSICAL THERAPIST

## 2022-07-13 NOTE — PROGRESS NOTES
Daily Note     Today's date: 2022  Patient name: Jessica Colon  : 1955  MRN: [id number]  Referring provider: Zeina Norris MD  Dx:   Encounter Diagnosis     ICD-10-CM    1  Status post total right knee replacement  Z96 651    2  Right knee pain, unspecified chronicity  M25 561    3  Aftercare following right knee joint replacement surgery  Z47 1     Z96 651                   Subjective: Pt comes to therapy denying notable changes since last session  Ambulates into clinic without an assistive device  Objective: See treatment diary below      Assessment: Tolerated treatment well  Patient demonstrated fatigue post treatment, exhibited good technique with therapeutic exercises and would benefit from continued PT      Plan: Progress treatment as tolerated         Precautions: HTN    Daily Treatment Diary    Date 7/13  6/15 6/20 6/22 6/27 6/29 7/6 7/8 7/11   FOTO             Re-Eval          MR      Manuals    PROM flex/ext TK  seated TK Seated NB Seated  SC Seated  TE MR seated/supine  Supine TE MD    Patellar mobs TK            PROM hip                          Neuro Re-Ed     Tandem stance   nv 20"x3 ea 20"x3 ea   Partial-full tandem nv 20"x3 ea full tandem missed     SLS 30"x3 b/l         nv                                          Ther Ex                 Mini squats x20  nv 2x10 2x10 2x10 2x10  2x10 2x10 2x10   Stand hip abd, ext 2# x20 ea  1x10 ea B 1x12 ea 1x12 ea 1x10 1x10 ea  2x10 2x10 ea 1 5#  2x10 ea   HR/TR D/C  x20 x20 x20 x20 x20  x30 x30 x30    Standing gastroc stretch D/C HEP  30"x3 30"x3 manual manual 30"x3   Supine 30"x3   Supine Standing  30"x3 standing 30"x3   Knee flex/ext stretch on step 10"x10  10"x10 10"x10 10"x7 10"x10 10"x10 10"x10 10"x10 10"x10   Prone quad stretch  30"x3         30"x3   Prone hang  2# 1x2'         1#  2'x2   Quad sets     10"x10 10"x10 5"x25 10"x10 10"x10 10"x10 HEP   Heel slides Supine 10"x10  Seated 5"x10 Seated 5"x15 Seated 5"x15 Seated 5"x15 10"x10 supine Seated 10"x10 Seated 10"x10 Supine  10"x10    Bridges             LAQ missed  5"x20 5"x25 5"x25 5"x25 5"x25  5"x30 1 5#  5"  2x10 1 5#  5" 2x10                             Ther Activity    bike 5 min  5 min 5 min 5 min 5 min 5 min  5 min 5 min 5 min                 Gait Training            /c SPC x30ft SPC  30ft x2   CS,VC D/C                Modalities    CP PRN   def def def  def def

## 2022-07-18 ENCOUNTER — OFFICE VISIT (OUTPATIENT)
Dept: PHYSICAL THERAPY | Facility: REHABILITATION | Age: 67
End: 2022-07-18
Payer: MEDICARE

## 2022-07-18 DIAGNOSIS — M25.561 RIGHT KNEE PAIN, UNSPECIFIED CHRONICITY: Primary | ICD-10-CM

## 2022-07-18 DIAGNOSIS — Z96.651 AFTERCARE FOLLOWING RIGHT KNEE JOINT REPLACEMENT SURGERY: ICD-10-CM

## 2022-07-18 DIAGNOSIS — Z47.1 AFTERCARE FOLLOWING RIGHT KNEE JOINT REPLACEMENT SURGERY: ICD-10-CM

## 2022-07-18 DIAGNOSIS — Z96.651 STATUS POST TOTAL RIGHT KNEE REPLACEMENT: ICD-10-CM

## 2022-07-18 PROCEDURE — 97110 THERAPEUTIC EXERCISES: CPT | Performed by: PHYSICAL THERAPIST

## 2022-07-18 PROCEDURE — 97112 NEUROMUSCULAR REEDUCATION: CPT | Performed by: PHYSICAL THERAPIST

## 2022-07-18 NOTE — PROGRESS NOTES
Daily Note     Today's date: 2022  Patient name: Ramón Guzman  : 1955  MRN: [id number]  Referring provider: Evie Flores MD  Dx:   Encounter Diagnosis     ICD-10-CM    1  Right knee pain, unspecified chronicity  M25 561    2  Aftercare following right knee joint replacement surgery  Z47 1     Z96 651    3  Status post total right knee replacement  Z96 651                   Subjective: Pt comes to therapy denying pain or discomfort  Reports having discomfort following last treatment session  Objective: See treatment diary below      Assessment: Tolerated treatment well  Patient demonstrated fatigue post treatment, exhibited good technique with therapeutic exercises and would benefit from continued PT      Plan: Progress treatment as tolerated         Precautions: HTN    Daily Treatment Diary    Date    FOTO             Re-Eval          MR      Manuals    PROM flex/ext TK TK  Seated NB Seated  SC Seated  TE MR seated/supine  Supine TE MD    Patellar mobs Shikha Ky           PROM hip                          Neuro Re-Ed     Tandem stance    20"x3 ea 20"x3 ea   Partial-full tandem nv 20"x3 ea full tandem missed     SLS 30"x3 b/l 30"x3 b/l        nv                                          Ther Ex                 Mini squats x20   2x10 2x10 2x10 2x10  2x10 2x10 2x10   Stand hip abd, ext 2# x20 ea 3# x20 ea  1x12 ea 1x12 ea 1x10 1x10 ea  2x10 2x10 ea 1 5#  2x10 ea   HR/TR D/C   x20 x20 x20 x20  x30 x30 x30    Standing gastroc stretch D/C HEP   30"x3 manual manual 30"x3   Supine 30"x3   Supine Standing  30"x3 standing 30"x3   Knee flex/ext stretch on step 10"x10 10"x10  10"x10 10"x7 10"x10 10"x10 10"x10 10"x10 10"x10   Prone quad stretch  30"x3 30"x3        30"x3   Prone hang  2# 1x2' 2# 1x2'        1#  2'x2   Quad sets     10"x10 10"x10 5"x25 10"x10 10"x10 10"x10 HEP   Heel slides Supine 10"x10 np  Seated 5"x15 Seated 5"x15 Seated 5"x15 10"x10 supine  Seated 10"x10 Seated 10"x10 Supine  10"x10    Bridges             LAQ missed 3# 3x10  5"x25 5"x25 5"x25 5"x25  5"x30 1 5#  5"  2x10 1 5#  5" 2x10   SLR - flex  nv           Bridge  nv                        Ther Activity    bike 5 min 10 min  5 min 5 min 5 min 5 min  5 min 5 min 5 min                 Gait Training            /c SPC x30ft SPC  30ft x2   CS,VC D/C                Modalities    CP PRN    def def  def def

## 2022-07-20 ENCOUNTER — APPOINTMENT (OUTPATIENT)
Dept: PHYSICAL THERAPY | Facility: REHABILITATION | Age: 67
End: 2022-07-20
Payer: MEDICARE

## 2022-07-21 ENCOUNTER — APPOINTMENT (OUTPATIENT)
Dept: PHYSICAL THERAPY | Facility: REHABILITATION | Age: 67
End: 2022-07-21
Payer: MEDICARE

## 2022-07-21 NOTE — PROGRESS NOTES
Daily Note     Today's date: 2022  Patient name: Jessee Li  : 1955  MRN: [id number]  Referring provider: Kathy Bustamante MD  Dx:   Encounter Diagnosis     ICD-10-CM    1  Right knee pain, unspecified chronicity  M25 561    2  Aftercare following right knee joint replacement surgery  Z47 1     Z96 651    3  Status post total right knee replacement  Z96 651                   Subjective: ***      Objective: See treatment diary below      Assessment: Tolerated treatment {Tolerated treatment :[id number]}   Patient {assessment:[id number]}      Plan: {PLAN:[id number]}     Precautions: HTN    Daily Treatment Diary    Date    FOTO             Re-Eval          MR      Manuals    PROM flex/ext TK TK  Seated NB Seated  SC Seated  TE MR seated/supine  Supine TE MD    Patellar mobs Clora Shen           PROM hip                          Neuro Re-Ed     Tandem stance    20"x3 ea 20"x3 ea   Partial-full tandem nv 20"x3 ea full tandem missed     SLS 30"x3 b/l 30"x3 b/l        nv                                          Ther Ex                 Mini squats x20   2x10 2x10 2x10 2x10  2x10 2x10 2x10   Stand hip abd, ext 2# x20 ea 3# x20 ea  1x12 ea 1x12 ea 1x10 1x10 ea  2x10 2x10 ea 1 5#  2x10 ea   HR/TR D/C   x20 x20 x20 x20  x30 x30 x30    Standing gastroc stretch D/C HEP   30"x3 manual manual 30"x3   Supine 30"x3   Supine Standing  30"x3 standing 30"x3   Knee flex/ext stretch on step 10"x10 10"x10  10"x10 10"x7 10"x10 10"x10 10"x10 10"x10 10"x10   Prone quad stretch  30"x3 30"x3        30"x3   Prone hang  2# 1x2' 2# 1x2'        1#  2'x2   Quad sets     10"x10 10"x10 5"x25 10"x10 10"x10 10"x10 HEP   Heel slides Supine 10"x10 np  Seated 5"x15 Seated 5"x15 Seated 5"x15 10"x10 supine  Seated 10"x10 Seated 10"x10 Supine  10"x10    Bridges             LAQ missed 3# 3x10  5"x25 5"x25 5"x25 5"x25  5"x30 1 5#  5"  2x10 1 5#  5" 2x10   SLR - flex  [employer] Ther Activity    bike 5 min 10 min  5 min 5 min 5 min 5 min  5 min 5 min 5 min                 Gait Training            /c SPC x30ft SPC  30ft x2   CS,VC D/C                Modalities    CP PRN    def def  def def

## 2022-07-25 ENCOUNTER — OFFICE VISIT (OUTPATIENT)
Dept: PHYSICAL THERAPY | Facility: REHABILITATION | Age: 67
End: 2022-07-25
Payer: MEDICARE

## 2022-07-25 DIAGNOSIS — Z47.1 AFTERCARE FOLLOWING RIGHT KNEE JOINT REPLACEMENT SURGERY: ICD-10-CM

## 2022-07-25 DIAGNOSIS — Z96.651 STATUS POST TOTAL RIGHT KNEE REPLACEMENT: ICD-10-CM

## 2022-07-25 DIAGNOSIS — Z96.651 AFTERCARE FOLLOWING RIGHT KNEE JOINT REPLACEMENT SURGERY: ICD-10-CM

## 2022-07-25 DIAGNOSIS — M25.561 RIGHT KNEE PAIN, UNSPECIFIED CHRONICITY: Primary | ICD-10-CM

## 2022-07-25 PROCEDURE — 97112 NEUROMUSCULAR REEDUCATION: CPT

## 2022-07-25 PROCEDURE — 97110 THERAPEUTIC EXERCISES: CPT

## 2022-07-25 NOTE — PROGRESS NOTES
Daily Note     Today's date: 2022  Patient name: Angelica Khalil  : 1955  MRN: [id number]  Referring provider: Tanika Bishop MD  Dx:   Encounter Diagnosis     ICD-10-CM    1  Right knee pain, unspecified chronicity  M25 561    2  Aftercare following right knee joint replacement surgery  Z47 1     Z96 651    3  Status post total right knee replacement  Z96 651                   Subjective:  Patient reports that her knee is feeling ok  She reports using a lot of ice which really helps  Her knee felt mostly tight from the humidity over the weekend  Objective: See treatment diary below      Assessment: Patient tolerated treatment well  Patient performed ex and received manual therapy as noted  Added bridging and SLR  Good R quad activation noted with SLR  Patient responded well to treatment and would benefit from continued PT intervention to address deficits and attain set goals  Plan: Continue per plan of care        Precautions: HTN    Daily Treatment Diary    Date    FOTO             Re-Eval          MR      Manuals    PROM flex/ext TK TK CC  Seated  SC Seated  TE MR seated/supine  Supine TE MD    Patellar mobs TK TK CC          PROM hip                          Neuro Re-Ed     Tandem stance     20"x3 ea   Partial-full tandem nv 20"x3 ea full tandem missed     SLS 30"x3 b/l 30"x3 b/l 30"x3 b/l       nv                                          Ther Ex                 Mini squats x20  x20  2x10 2x10 2x10  2x10 2x10 2x10   Stand hip abd, ext 2# x20 ea 3# x20 ea 3# x20 ea  1x12 ea 1x10 1x10 ea  2x10 2x10 ea 1 5#  2x10 ea   HR/TR D/C    x20 x20 x20  x30 x30 x30    Standing gastroc stretch D/C HEP    manual manual 30"x3   Supine 30"x3   Supine Standing  30"x3 standing 30"x3   Knee flex/ext stretch on step 10"x10 10"x10 10"x10  10"x7 10"x10 10"x10 10"x10 10"x10 10"x10   Prone quad stretch  30"x3 30"x3 30"x3       30"x3   Prone hang  2# 1x2' 2# 1x2' 2# 1x2' 1#  2'x2   Quad sets      10"x10 5"x25 10"x10 10"x10 10"x10 HEP   Heel slides Supine 10"x10 np   Seated 5"x15 Seated 5"x15 10"x10 supine  Seated 10"x10 Seated 10"x10 Supine  10"x10    Bridges             LAQ missed 3# 3x10 3#  3x10  5"x25 5"x25 5"x25  5"x30 1 5#  5"  2x10 1 5#  5" 2x10   SLR - flex  nv x10          Bridge  nv 5"x10                       Ther Activity    bike 5 min 10 min 10 min  5 min 5 min 5 min  5 min 5 min 5 min                 Gait Training            /c SPC x30ft SPC  30ft x2   CS,VC D/C                Modalities    CP PRN     def  def def

## 2022-07-27 ENCOUNTER — OFFICE VISIT (OUTPATIENT)
Dept: PHYSICAL THERAPY | Facility: REHABILITATION | Age: 67
End: 2022-07-27
Payer: MEDICARE

## 2022-07-27 DIAGNOSIS — Z96.651 STATUS POST TOTAL RIGHT KNEE REPLACEMENT: ICD-10-CM

## 2022-07-27 DIAGNOSIS — M25.561 RIGHT KNEE PAIN, UNSPECIFIED CHRONICITY: Primary | ICD-10-CM

## 2022-07-27 DIAGNOSIS — Z96.651 AFTERCARE FOLLOWING RIGHT KNEE JOINT REPLACEMENT SURGERY: ICD-10-CM

## 2022-07-27 DIAGNOSIS — Z47.1 AFTERCARE FOLLOWING RIGHT KNEE JOINT REPLACEMENT SURGERY: ICD-10-CM

## 2022-07-27 PROCEDURE — 97140 MANUAL THERAPY 1/> REGIONS: CPT | Performed by: PHYSICAL THERAPIST

## 2022-07-27 PROCEDURE — 97110 THERAPEUTIC EXERCISES: CPT | Performed by: PHYSICAL THERAPIST

## 2022-07-27 NOTE — PROGRESS NOTES
Daily Note     Today's date: 2022  Patient name: Lexis Zhang  : 1955  MRN: [id number]  Referring provider: Pamela Sanchez MD  Dx:   Encounter Diagnosis     ICD-10-CM    1  Right knee pain, unspecified chronicity  M25 561    2  Aftercare following right knee joint replacement surgery  Z47 1     Z96 651    3  Status post total right knee replacement  Z96 651                   Subjective: Pt comes to therapy denying pain or discomfort  Denies discomfort following last treatment session  Objective: See treatment diary below      Assessment: Tolerated treatment well  Demonstrates good passive range of motion in flexion and extension  Demonstrates mild gait abnormalities  Patient demonstrated fatigue post treatment, exhibited good technique with therapeutic exercises and would benefit from continued PT      Plan: Progress treatment as tolerated         Precautions: HTN    Daily Treatment Diary    Date    FOTO             Re-Eval          MR      Manuals    PROM flex/ext TK TK CC TK  Seated  TE MR seated/supine  Supine TE MD    Patellar mobs TK TK CC TK         PROM hip                          Neuro Re-Ed     Tandem stance      nv 20"x3 ea full tandem missed     SLS 30"x3 b/l 30"x3 b/l 30"x3 b/l 30"x3 b/l      nv                                          Ther Ex                 Mini squats x20  x20 x20  2x10 2x10  2x10 2x10 2x10   Stand hip abd, ext 2# x20 ea 3# x20 ea 3# x20 ea 3# x20 ea  1x10 1x10 ea  2x10 2x10 ea 1 5#  2x10 ea   Standing knee flex    3# x20 ea         HR/TR D/C     x20 x20  x30 x30 x30    Standing gastroc stretch D/C HEP     manual 30"x3   Supine 30"x3   Supine Standing  30"x3 standing 30"x3   Knee flex/ext stretch on step 10"x10 10"x10 10"x10 10"x10  10"x10 10"x10 10"x10 10"x10 10"x10   Prone quad stretch  30"x3 30"x3 30"x3 np      30"x3   Prone hang  2# 1x2' 2# 1x2' 2# 1x2' np      1#  2'x2   Quad sets       5"x25 10"x10 10"x10 10"x10 HEP Heel slides Supine 10"x10 np    Seated 5"x15 10"x10 supine  Seated 10"x10 Seated 10"x10 Supine  10"x10    LAQ missed 3# 3x10 3#  3x10   5"x25 5"x25  5"x30 1 5#  5"  2x10 1 5#  5" 2x10   SLR - flex  nv x10 5"x10         Bridge  nv 5"x10 5"x10                      Ther Activity    bike 5 min 10 min 10 min 10 min  5 min 5 min  5 min 5 min 5 min                 Gait Training            /c SPC x30ft SPC  30ft x2   CS,VC D/C                Modalities    CP PRN       def def

## 2022-08-01 ENCOUNTER — OFFICE VISIT (OUTPATIENT)
Dept: PHYSICAL THERAPY | Facility: REHABILITATION | Age: 67
End: 2022-08-01
Payer: MEDICARE

## 2022-08-01 DIAGNOSIS — M25.561 RIGHT KNEE PAIN, UNSPECIFIED CHRONICITY: ICD-10-CM

## 2022-08-01 DIAGNOSIS — Z47.1 AFTERCARE FOLLOWING RIGHT KNEE JOINT REPLACEMENT SURGERY: ICD-10-CM

## 2022-08-01 DIAGNOSIS — Z96.651 AFTERCARE FOLLOWING RIGHT KNEE JOINT REPLACEMENT SURGERY: ICD-10-CM

## 2022-08-01 DIAGNOSIS — Z96.651 STATUS POST TOTAL RIGHT KNEE REPLACEMENT: Primary | ICD-10-CM

## 2022-08-01 PROCEDURE — 97112 NEUROMUSCULAR REEDUCATION: CPT | Performed by: PHYSICAL THERAPIST

## 2022-08-01 PROCEDURE — 97110 THERAPEUTIC EXERCISES: CPT | Performed by: PHYSICAL THERAPIST

## 2022-08-03 ENCOUNTER — OFFICE VISIT (OUTPATIENT)
Dept: PHYSICAL THERAPY | Facility: REHABILITATION | Age: 67
End: 2022-08-03
Payer: MEDICARE

## 2022-08-03 DIAGNOSIS — Z47.1 AFTERCARE FOLLOWING RIGHT KNEE JOINT REPLACEMENT SURGERY: Primary | ICD-10-CM

## 2022-08-03 DIAGNOSIS — Z96.651 AFTERCARE FOLLOWING RIGHT KNEE JOINT REPLACEMENT SURGERY: Primary | ICD-10-CM

## 2022-08-03 DIAGNOSIS — M25.561 RIGHT KNEE PAIN, UNSPECIFIED CHRONICITY: ICD-10-CM

## 2022-08-03 DIAGNOSIS — Z96.651 STATUS POST TOTAL RIGHT KNEE REPLACEMENT: ICD-10-CM

## 2022-08-03 PROCEDURE — 97110 THERAPEUTIC EXERCISES: CPT | Performed by: PHYSICAL THERAPIST

## 2022-08-03 PROCEDURE — 97140 MANUAL THERAPY 1/> REGIONS: CPT | Performed by: PHYSICAL THERAPIST

## 2022-08-03 NOTE — PROGRESS NOTES
Daily Note     Today's date: 8/3/2022  Patient name: Ramirez Sharma  : 1955  MRN: [id number]  Referring provider: Lee Garcia MD  Dx:   Encounter Diagnosis     ICD-10-CM    1  Aftercare following right knee joint replacement surgery  Z47 1     Z96 651    2  Right knee pain, unspecified chronicity  M25 561    3  Status post total right knee replacement  Z96 651                   Subjective: Pt comes to therapy denying pain or discomfort  Denies discomfort following last treatment session  Objective: See treatment diary below      Assessment: Tolerated treatment well  Patient demonstrated fatigue post treatment, exhibited good technique with therapeutic exercises and would benefit from continued PT      Plan: Progress treatment as tolerated         Precautions: HTN    Daily Treatment Diary    Date 7/13 7/18 7/25 7/27 8/1 8/3  7/6 7/8 7/11   FOTO             Re-Eval          MR      Manuals    PROM flex/ext TK TK CC TK MR TK  Supine TE MD    Patellar mobs TK TK CC TK MR TK       PROM hip                          Neuro Re-Ed     Tandem stance        missed     SLS 30"x3 b/l 30"x3 b/l 30"x3 b/l 30"x3 b/l 30"x3 b/l 30"x3 b/l    nv                                          Ther Ex                 Mini squats x20  x20 x20 x20 x20  2x10 2x10 2x10   Stand hip abd, ext 2# x20 ea 3# x20 ea 3# x20 ea 3# x20 ea 3# x20 ea 4# x20 ea b/l  2x10 2x10 ea 1 5#  2x10 ea   Standing knee flex    3# x20 ea 3# x20 ea 4# x20 ea       HR/TR D/C       x30 x30 x30    Standing gastroc stretch D/C HEP       30"x3   Supine Standing  30"x3 standing 30"x3   Knee flex/ext stretch on step 10"x10 10"x10 10"x10 10"x10 10"x10 10"x10  10"x10 10"x10 10"x10   Prone quad stretch  30"x3 30"x3 30"x3 np 30"x3 30"x3    30"x3   Prone hang  2# 1x2' 2# 1x2' 2# 1x2' np 3# 2' 4# x2'    1#  2'x2   Heel slides Supine 10"x10 np      Seated 10"x10 Seated 10"x10 Supine  10"x10    LAQ missed 3# 3x10 3#  3x10  np   5"x30 1 5#  5"  2x10 1 5#  5" 2x10   SLR - flex nv x10 5"x10 5"x10 5"x20       Bridge  nv 5"x10 5"x10 5"x10 5"x20                    Ther Activity    bike 5 min 10 min 10 min 10 min 10 min  10 min  5 min 5 min 5 min                 Gait Training            /c SPC x30ft SPC  30ft x2   CS,VC D/C                Modalities    CP PRN        def

## 2022-08-08 ENCOUNTER — OFFICE VISIT (OUTPATIENT)
Dept: PHYSICAL THERAPY | Facility: REHABILITATION | Age: 67
End: 2022-08-08
Payer: MEDICARE

## 2022-08-08 DIAGNOSIS — Z47.1 AFTERCARE FOLLOWING RIGHT KNEE JOINT REPLACEMENT SURGERY: Primary | ICD-10-CM

## 2022-08-08 DIAGNOSIS — Z96.651 AFTERCARE FOLLOWING RIGHT KNEE JOINT REPLACEMENT SURGERY: Primary | ICD-10-CM

## 2022-08-08 DIAGNOSIS — Z96.651 STATUS POST TOTAL RIGHT KNEE REPLACEMENT: ICD-10-CM

## 2022-08-08 DIAGNOSIS — M25.561 RIGHT KNEE PAIN, UNSPECIFIED CHRONICITY: ICD-10-CM

## 2022-08-08 PROCEDURE — 97140 MANUAL THERAPY 1/> REGIONS: CPT

## 2022-08-08 PROCEDURE — 97110 THERAPEUTIC EXERCISES: CPT

## 2022-08-08 NOTE — PROGRESS NOTES
Daily Note     Today's date: 2022  Patient name: Christi Duque  : 1955  MRN: [id number]  Referring provider: Jose Morales MD  Dx:   Encounter Diagnosis     ICD-10-CM    1  Aftercare following right knee joint replacement surgery  Z47 1     Z96 651    2  Right knee pain, unspecified chronicity  M25 561    3  Status post total right knee replacement  Z96 651                   Subjective: pt presents to therapy with antalgic gait reporting pain in her R hip  She denied knee pain pre-tx  Objective: See treatment diary below      Assessment: Tolerated treatment well and without complaints  Performed PROM for passive flexion in supine and prone; less tolerance in prone, but patient appeared less guarded doing so  Good response with exercises and recall doing so  Patient demonstrated fatigue post treatment, exhibited good technique with therapeutic exercises and would benefit from continued PT      Plan: Continue per plan of care  Progress treatment as tolerated         Precautions: HTN    Daily Treatment Diary    Date 7/13 7/18 7/25 7/27 8/1 8/3 8/8  7/8 7/11   FOTO             Re-Eval          MR      Manuals    PROM flex/ext Arlyss Has CC TK MR TK TE  MD    Patellar mobs TK TK CC TK MR TK TE      PROM hip                          Neuro Re-Ed     Tandem stance             SLS 30"x3 b/l 30"x3 b/l 30"x3 b/l 30"x3 b/l 30"x3 b/l 30"x3 b/l 30"x3 b/l   nv                                          Ther Ex                 Mini squats x20  x20 x20 x20 x20 x20  2x10 2x10   Stand hip abd, ext 2# x20 ea 3# x20 ea 3# x20 ea 3# x20 ea 3# x20 ea 4# x20 ea b/l 4# x20 ea b/l  2x10 ea 1 5#  2x10 ea   Standing knee flex    3# x20 ea 3# x20 ea 4# x20 ea 4# x20 ea b/l      HR/TR D/C        x30 x30    Standing gastroc stretch D/C HEP        Standing  30"x3 standing 30"x3   Knee flex/ext stretch on step 10"x10 10"x10 10"x10 10"x10 10"x10 10"x10 10"x10  10"x10 10"x10   Prone quad stretch  30"x3 30"x3 30"x3 np 30"x3 30"x3 30"x3   30"x3 Prone hang  2# 1x2' 2# 1x2' 2# 1x2' np 3# 2' 4# x2' 4# x2'   1#  2'x2   Heel slides Supine 10"x10 np       Seated 10"x10 Supine  10"x10    LAQ missed 3# 3x10 3#  3x10  np    1 5#  5"  2x10 1 5#  5" 2x10   SLR - flex  nv x10 5"x10 5"x10 5"x20 5"x20      Bridge  nv 5"x10 5"x10 5"x10 5"x20 5"x20                   Ther Activity    bike 5 min 10 min 10 min 10 min 10 min  10 min 10 min  5 min 5 min                 Gait Training             SPC  30ft x2   CS,VC D/C                Modalities    CP PRN        def

## 2022-08-10 ENCOUNTER — OFFICE VISIT (OUTPATIENT)
Dept: PHYSICAL THERAPY | Facility: REHABILITATION | Age: 67
End: 2022-08-10
Payer: MEDICARE

## 2022-08-10 DIAGNOSIS — Z96.651 AFTERCARE FOLLOWING RIGHT KNEE JOINT REPLACEMENT SURGERY: Primary | ICD-10-CM

## 2022-08-10 DIAGNOSIS — Z47.1 AFTERCARE FOLLOWING RIGHT KNEE JOINT REPLACEMENT SURGERY: Primary | ICD-10-CM

## 2022-08-10 DIAGNOSIS — Z96.651 STATUS POST TOTAL RIGHT KNEE REPLACEMENT: ICD-10-CM

## 2022-08-10 DIAGNOSIS — M25.561 RIGHT KNEE PAIN, UNSPECIFIED CHRONICITY: ICD-10-CM

## 2022-08-10 PROCEDURE — 97110 THERAPEUTIC EXERCISES: CPT

## 2022-08-10 PROCEDURE — 97112 NEUROMUSCULAR REEDUCATION: CPT

## 2022-08-10 NOTE — PROGRESS NOTES
Daily Note     Today's date: 8/10/2022  Patient name: Gurmeet King  : 1955  MRN: [id number]  Referring provider: Bita Paniagua MD  Dx:   Encounter Diagnosis     ICD-10-CM    1  Aftercare following right knee joint replacement surgery  Z47 1     Z96 651    2  Right knee pain, unspecified chronicity  M25 561    3  Status post total right knee replacement  Z96 651                   Subjective:  Sherry Ptaten reports that her knee is feeling good  She denies any increased pain or soreness since her last visit  Objective: See treatment diary below      Assessment: Patient tolerated treatment well  Patient performed ex and received manual therapy as noted  Patient demonstrates a good understanding of the exercises performed  She continues to be limited in R knee extension with a firm end feel noted  Patient is making progress with R knee flexion ROM and strength  Patient would benefit from continued PT intervention to address deficits and attain set goals  Plan: Continue per plan of care        Precautions: HTN    Daily Treatment Diary    Date 7/13 7/18 7/25 7/27 8/1 8/3 8/8 8/10     FOTO             Re-Eval                Manuals    PROM flex/ext TK TK CC TK MR TK TE CC     Patellar mobs TK TK CC TK MR TK TE CC     PROM hip                          Neuro Re-Ed     Tandem stance             SLS 30"x3 b/l 30"x3 b/l 30"x3 b/l 30"x3 b/l 30"x3 b/l 30"x3 b/l 30"x3 b/l 30"x3 b/l                                            Ther Ex                 Mini squats x20  x20 x20 x20 x20 x20 x20     Stand hip abd, ext 2# x20 ea 3# x20 ea 3# x20 ea 3# x20 ea 3# x20 ea 4# x20 ea b/l 4# x20 ea b/l 4#  x20  Ea b/l     Standing knee flex    3# x20 ea 3# x20 ea 4# x20 ea 4# x20 ea b/l 4#  x20 ea b/l     HR/TR D/C            Standing gastroc stretch D/C HEP            Knee flex/ext stretch on step 10"x10 10"x10 10"x10 10"x10 10"x10 10"x10 10"x10 10"x10     Prone quad stretch  30"x3 30"x3 30"x3 np 30"x3 30"x3 30"x3 30"x3     Prone hang  2# 1x2' 2# 1x2' 2# 1x2' np 3# 2' 4# x2' 4# x2' 4#x2'     Heel slides Supine 10"x10 np           LAQ missed 3# 3x10 3#  3x10  np        SLR - flex  nv x10 5"x10 5"x10 5"x20 5"x20 5"x20     Bridge  nv 5"x10 5"x10 5"x10 5"x20 5"x20 5"x20                  Ther Activity    bike 5 min 10 min 10 min 10 min 10 min  10 min 10 min 10 min                  Gait Training                              Modalities    CP PRN

## 2022-08-11 ENCOUNTER — OFFICE VISIT (OUTPATIENT)
Dept: URGENT CARE | Age: 67
End: 2022-08-11
Payer: MEDICARE

## 2022-08-11 VITALS
HEART RATE: 83 BPM | SYSTOLIC BLOOD PRESSURE: 110 MMHG | OXYGEN SATURATION: 98 % | TEMPERATURE: 98.6 F | DIASTOLIC BLOOD PRESSURE: 72 MMHG | RESPIRATION RATE: 20 BRPM

## 2022-08-11 DIAGNOSIS — H66.91 RIGHT OTITIS MEDIA, UNSPECIFIED OTITIS MEDIA TYPE: Primary | ICD-10-CM

## 2022-08-11 PROCEDURE — G0463 HOSPITAL OUTPT CLINIC VISIT: HCPCS | Performed by: NURSE PRACTITIONER

## 2022-08-11 PROCEDURE — 99213 OFFICE O/P EST LOW 20 MIN: CPT | Performed by: NURSE PRACTITIONER

## 2022-08-11 RX ORDER — AMOXICILLIN AND CLAVULANATE POTASSIUM 875; 125 MG/1; MG/1
1 TABLET, FILM COATED ORAL EVERY 12 HOURS SCHEDULED
Qty: 14 TABLET | Refills: 0 | Status: SHIPPED | OUTPATIENT
Start: 2022-08-11 | End: 2022-08-18

## 2022-08-11 RX ORDER — METFORMIN HYDROCHLORIDE 500 MG/1
500 TABLET, EXTENDED RELEASE ORAL 2 TIMES DAILY WITH MEALS
COMMUNITY
Start: 2022-07-17

## 2022-08-11 RX ORDER — GABAPENTIN 600 MG/1
600 TABLET ORAL 3 TIMES DAILY
COMMUNITY
Start: 2022-07-25

## 2022-08-11 RX ORDER — METOPROLOL SUCCINATE 50 MG/1
TABLET, EXTENDED RELEASE ORAL
COMMUNITY
Start: 2022-07-31

## 2022-08-11 RX ORDER — LOSARTAN POTASSIUM AND HYDROCHLOROTHIAZIDE 12.5; 1 MG/1; MG/1
1 TABLET ORAL DAILY
COMMUNITY
Start: 2022-04-27 | End: 2023-04-27

## 2022-08-11 RX ORDER — OXYCODONE HYDROCHLORIDE 10 MG/1
15 TABLET ORAL EVERY 8 HOURS PRN
COMMUNITY
Start: 2022-07-20 | End: 2022-08-19

## 2022-08-11 NOTE — PATIENT INSTRUCTIONS
Chronic Pain  Pain serves an important role. It lets you know something is wrong that needs your attention. When the body heals, pain normally goes away.  When pain lasts longer than six months, it is called chronic pain. This is pain that is present even after the body has healed. Chronic pain can cause mood problems and get in the way of your relationships and your daily life.  A number of conditions can cause chronic pain. Some of the more common include:  · Previous surgery  · An old injury  · Infection  · Diseases such as diabetes  · Nerve damage  · Back injury  · Arthritis  · Migraine or other headaches  · Fibromyalgia  · Cancer  Depression and stress can make chronic pain symptoms worse. In some cases, a cause for the pain cannot be found.   Treatment  Treatment can greatly reduce pain. In many cases, pain can become less severe, occur less often, and interfere less with your daily life. Chronic pain is often treated with a combination of medicines, therapies, and lifestyle changes. You will work closely with your healthcare provider to find a treatment plan that works best for you.  · Ask your healthcare provider for a referral to a pain management specialty center. These can provide the most recent and proven pain management strategies, along with emotional support and comprehensive services.  · Several different types of medicines may be prescribed for chronic pain. Work with your healthcare provider to develop a medicine plan that helps manage your pain.  · Physical therapy can be very effective in helping reduce certain types of chronic pain.  · Occupational therapy teaches you how to do routine tasks of daily living in ways that lessen your discomfort.  · Psychological therapy can help you cope better with stress and pain.  · Other therapies such as meditation, yoga, biofeedback, massage, and acupuncture can also help manage chronic pain.  · Changing certain habits can help reduce chronic pain. They  Take meds as directed  Use flonase daily   Use zyrtec or allegra daily   Don't put anything into the right ear, don't use q-tips  include:  ¨ Eating healthy  ¨ Developing an exercise routine  ¨ Getting enough sleep at night  ¨ Stopping smoking and limiting alcohol use  ¨ Losing excess weight  Follow-up care  Follow up with your healthcare provider as advised. Let your healthcare provider know if your current treatment plan is working or if changes are needed.  Resources  For more information, contact:  · [employer] [place] [employer] Society www.achenet.org  · American Chronic Pain Association www.theacpa.org 057-586-8143  Date Last Reviewed: 7/26/2015 © 2000-2016 [employer]. [address] [place] [address]. All rights reserved. This information is not intended as a substitute for professional medical care. Always follow your healthcare professional's instructions.          Bladder Infection, Male (Adult)    You have a bladder infection.  Urine is normally free of bacteria. But bacteria can get into the urinary tract from the skin around the rectum or it may travel in the blood from elsewhere in the body.  This is called a urinary tract infection (UTI). An infection can occur anywhere in the urinary tract. It could be in a kidney (pyelonephritis)or in the bladder (cystitis) and urethra (urethritis). The urethra is the tube that drains the urine from the bladder through the tip of the penis.  The most common place for a UTI is in the bladder. This is called a bladder infection. Most bladder infections are easily treated. They are not serious unless the infection spreads up to the kidney.  The terms bladder infection, UTI, and cystitis are often used to describe the same thing, but they arent always the same. Cystitis is an inflammation of the bladder. The most common cause of cystitis is an infection.   Keep in mind:  · Infections in the urine are called UTIs.  · Cystitis is usually caused by a UTI.  · Not all UTIs and cases of cystitis are bladder infections.  · Bladder infections are the most common type of  cystitis.  Symptoms of a bladder infection  The infection causes inflammation in the urethra and bladder. This inflammation causes many of the symptoms. The most common symptoms of a bladder infection are:  · Pain or burning when urinating  · Having to go more often than usual  · Feeling like you need to go right away  · Only a small amount comes out  · Blood in urine  · Discomfort in your belly (abdomen), usually in the lower abdomen, above the pubic bone  · Cloudy, strong, or bad smelling urine  · Unable to urinate (retention)  · Urinary incontinence  · Fever  · Loss of appetite  Older adults may also feel confused.  Causes of a bladder infection  Bladder infections are not contagious. You can't get one from someone else, from a toilet seat, or from sharing a bath.  The most common cause of bladder infections is bacteria from the bowels. The bacteria get onto the skin around the opening of the urethra. From there they can get into the urine and travel up to the bladder. This causes inflammation and an infection. This usually happens because of:  · An enlarged prostate  · Poor cleaning of the genitals  · Procedures that put a tube in your bladder, like a Hi catheter  · Bowel incontinence  · Older age  · Not emptying your bladder (The urine stays there, giving the bacteria a chance to grow.)  · Dehydration (This allows urine to stay in the bladder longer.)  · Constipation (This can cause the bowels to push on the bladder or urethra and keep the bladder from emptying.)  Treatment  Bladder infections are treated with antibiotics. They usually clear up quickly without complications. Treatment helps prevent a more serious kidney infection.  Medicines  Medicines can help in the treatment of a bladder infection:  · You may have been given phenazopyridine to ease burning when you urinate. It will cause your urine to be bright orange. It can stain clothing.  · You may have been prescribed antibiotics. Take this medicine  until you have finished it, even if you feel better. Taking all of the medicine will make sure the infection has cleared.  You can use acetaminophen or ibuprofen for pain, fever, or discomfort, unless another medicine was prescribed. You can also alternate them, or use both together. They work differently and are a different class of medicines, so taking them together is not an overdose. If you have chronic liver or kidney disease, talk with your healthcare provider before using these medicines. Also talk with your provider if youve had a stomach ulcer or GI bleeding or are taking blood thinner medicines.  Home care  Here are some guidelines to help you care for yourself at home:  · Drink plenty of fluids, unless your healthcare provider told you not to. Fluids will prevent dehydration and flush out your bladder.  · Use good personal hygiene. Wipe from front to back after using the toilet, and clean your penis regularly. If you arent circumcised, retract the foreskin when cleaning.  · Urinate more frequently, and dont try to hold it in for long periods of time, if possible.  · Wear loose-fitting clothes and cotton underwear. Avoid tight-fitting pants. This helps keep you clean and dry.  · Change your diet to prevent constipation. This means eating more fresh foods and more fiber, and less junk and fatty foods.  · Avoid sex until your symptoms are gone.  · Avoid caffeine, alcohol, and spicy foods. These can irritate the bladder.  Follow-up care  Follow up with your healthcare provider, or as advised if all symptoms have not cleared up within 5 days. It is important to keep your follow-up appointment. You can talk with your provider to see if you need more tests of the urinary tract. This is especially important if you have infections that keep coming back.  If a culture was done, you will be told if your treatment needs to be changed. If directed, you can call to find out the results.  If X-rays were taken, you  will be told of any findings that may affect your care.  Call 911  Call 911 if any of these occur:  · Trouble breathing  · Difficulty waking up  · Feeling confused  · Fainting or loss of consciousness  · Rapid heart rate  When to seek medical advice  Call your healthcare provider right away if any of these occur:  · Fever of 100.4ºF (38ºC) or higher, or as directed by your healthcare provider  · Your symptoms dont improve after 2 days of treatment  · Back or abdominal pain that gets worse  · Repeated vomiting, or you arent able to keep medicine down  · Weakness or dizziness  Date Last Reviewed: 10/1/2016  © 0373-1170 [employer]. [address] [place] [address], [place] [address]. All rights reserved. This information is not intended as a substitute for professional medical care. Always follow your healthcare professional's instructions.

## 2022-08-11 NOTE — PROGRESS NOTES
NAME: Renee Hardy is a 74 y o  female  : 1955    MRN: [id number]    /72   Pulse 83   Temp 98 6 °F (37 °C) (Tympanic)   Resp 20   SpO2 98%     Assessment and Plan   Right otitis media, unspecified otitis media type [H66 91]  1  Right otitis media, unspecified otitis media type  amoxicillin-clavulanate (AUGMENTIN) 875-125 mg per tablet       Johnie Sender was seen today for earache  Diagnoses and all orders for this visit:    Right otitis media, unspecified otitis media type  -     amoxicillin-clavulanate (AUGMENTIN) 875-125 mg per tablet; Take 1 tablet by mouth every 12 (twelve) hours for 7 days        Patient Instructions   Patient Instructions   Take meds as directed  Use flonase daily   Use zyrtec or allegra daily   Don't put anything into the right ear, don't use q-tips  Proceed to the nearest ER if symptoms worsen, Follow up with your PCP  Continue to social distance, wash your hands, and wear your masks  Please continue to follow the CDC  gov guidelines daily for they are subject to change on COVID-19    Chief Complaint     Chief Complaint   Patient presents with    Earache     Pt presents for right ear pain, trouble hearing and feeling clogged for the past 2 days  History of Present Illness     This is a 72-year-old female who presents today with pain drainage and damp feeling in her right ear she states she has decreased hearing in that ear  She denies any swimming  She denies any postnasal drip or external pain to the right ear  Patient also denies any injury or trauma to the right ear along with not using any Q-tips  Patient denies using anything over-the-counter to help with her symptoms at this time  Patient denies using ear buds  Patient denies any recent earwax removal       Review of Systems   Review of Systems   Constitutional: Negative for fever  HENT: Positive for ear pain (right ear pain)  Negative for congestion, ear discharge and facial swelling           Reports that it is moist   Respiratory: Negative  Cardiovascular: Negative  Gastrointestinal: Negative  Current Medications       Current Outpatient Medications:     amoxicillin-clavulanate (AUGMENTIN) 875-125 mg per tablet, Take 1 tablet by mouth every 12 (twelve) hours for 7 days, Disp: 14 tablet, Rfl: 0    gabapentin (NEURONTIN) 600 MG tablet, Take 600 mg by mouth 3 (three) times a day, Disp: , Rfl:     losartan-hydrochlorothiazide (HYZAAR) 100-12 5 MG per tablet, Take 1 tablet by mouth daily, Disp: , Rfl:     metFORMIN (GLUCOPHAGE-XR) 500 mg 24 hr tablet, Take 500 mg by mouth 2 (two) times a day with meals, Disp: , Rfl:     metoprolol succinate (TOPROL-XL) 50 mg 24 hr tablet, , Disp: , Rfl:     oxyCODONE (ROXICODONE) 10 MG TABS, Take 15 mg by mouth every 8 (eight) hours as needed, Disp: , Rfl:     Current Allergies     Allergies as of 08/11/2022 - Reviewed 08/11/2022   Allergen Reaction Noted    Bee venom Other (See Comments) 06/09/2022              Past Medical History:   Diagnosis Date    Hypertension        Past Surgical History:   Procedure Laterality Date    JOINT REPLACEMENT Right     knee       History reviewed  No pertinent family history  Medications have been verified  The following portions of the patient's history were reviewed and updated as appropriate: allergies, current medications, past family history, past medical history, past social history, past surgical history and problem list     Objective   /72   Pulse 83   Temp 98 6 °F (37 °C) (Tympanic)   Resp 20   SpO2 98%      Physical Exam     Physical Exam  Constitutional:       General: She is not in acute distress  Appearance: Normal appearance  She is not ill-appearing  HENT:      Head: Normocephalic  Right Ear: Hearing normal  Tenderness present  No drainage  Tympanic membrane is erythematous        Left Ear: Hearing, tympanic membrane and ear canal normal       Ears:      Comments: Narrow R ear canal with some moisture noted, erythema present on the top of the right TM, scarring noted on the lower portion of the right TM, left TM appears normal      Nose: Nose normal       Mouth/Throat:      Lips: Pink  Mouth: Mucous membranes are moist       Pharynx: Oropharynx is clear  Cardiovascular:      Rate and Rhythm: Normal rate and regular rhythm  Pulmonary:      Effort: Pulmonary effort is normal       Breath sounds: Normal breath sounds and air entry  No decreased breath sounds  Neurological:      Mental Status: She is alert  Note: Portions of this record may have been created with voice recognition software  Occasional wrong word or "sound a like" substitutions may have occurred due to the inherent limitations of voice recognition software  Please read the chart carefully and recognize, using context, where substitutions have occurred  MARJORIE Pastrana

## 2022-08-15 ENCOUNTER — OFFICE VISIT (OUTPATIENT)
Dept: PHYSICAL THERAPY | Facility: REHABILITATION | Age: 67
End: 2022-08-15
Payer: MEDICARE

## 2022-08-15 DIAGNOSIS — Z96.651 STATUS POST TOTAL RIGHT KNEE REPLACEMENT: ICD-10-CM

## 2022-08-15 DIAGNOSIS — Z96.651 AFTERCARE FOLLOWING RIGHT KNEE JOINT REPLACEMENT SURGERY: Primary | ICD-10-CM

## 2022-08-15 DIAGNOSIS — M25.561 RIGHT KNEE PAIN, UNSPECIFIED CHRONICITY: ICD-10-CM

## 2022-08-15 DIAGNOSIS — Z47.1 AFTERCARE FOLLOWING RIGHT KNEE JOINT REPLACEMENT SURGERY: Primary | ICD-10-CM

## 2022-08-15 PROCEDURE — 97110 THERAPEUTIC EXERCISES: CPT | Performed by: PHYSICAL THERAPIST

## 2022-08-15 PROCEDURE — 97140 MANUAL THERAPY 1/> REGIONS: CPT | Performed by: PHYSICAL THERAPIST

## 2022-08-15 NOTE — PROGRESS NOTES
Daily Note     Today's date: 8/15/2022  Patient name: Coral Zimmerman  : 1955  MRN: [id number]  Referring provider: Archana Burnham MD  Dx:   Encounter Diagnosis     ICD-10-CM    1  Aftercare following right knee joint replacement surgery  Z47 1     Z96 651    2  Right knee pain, unspecified chronicity  M25 561    3  Status post total right knee replacement  Z96 651                   Subjective: Pt comes to therapy denying pain or discomfort  Denies discomfort following last treatment session  Objective: See treatment diary below      Assessment: Tolerated treatment well  Cuing and planning required for step ups/downs today  Cues for steps today as well  Patient demonstrated fatigue post treatment, exhibited good technique with therapeutic exercises and would benefit from continued PT      Plan: Progress treatment as tolerated         Precautions: HTN  DOS 22    Daily Treatment Diary    Date 7/13 7/18 7/25 7/27 8/1 8/3 8/8 8/10 8/15    FOTO             Re-Eval                Manuals    PROM flex/ext TK TK CC TK MR TK TE CC TK    Patellar mobs TK TK CC TK MR TK TE CC KT    PROM hip                          Neuro Re-Ed     Tandem stance             SLS 30"x3 b/l 30"x3 b/l 30"x3 b/l 30"x3 b/l 30"x3 b/l 30"x3 b/l 30"x3 b/l 30"x3 b/l 30"x3 b/l                                           Ther Ex                 Mini squats x20  x20 x20 x20 x20 x20 x20 2x15    Stand hip abd, ext 2# x20 ea 3# x20 ea 3# x20 ea 3# x20 ea 3# x20 ea 4# x20 ea b/l 4# x20 ea b/l 4# x20  ea b/l 4# x20  ea b/l    Standing knee flex    3# x20 ea 3# x20 ea 4# x20 ea 4# x20 ea b/l 4# x20 ea b/l 4# x20  ea b/l    Knee flex/ext stretch on step 10"x10 10"x10 10"x10 10"x10 10"x10 10"x10 10"x10 10"x10 10"x10    Prone quad stretch  30"x3 30"x3 30"x3 np 30"x3 30"x3 30"x3 30"x3 np    Prone hang  2# 1x2' 2# 1x2' 2# 1x2' np 3# 2' 4# x2' 4# x2' 4#x2' np    Heel slides Supine 10"x10 np           SA knee ext         44# x10    SA knee flex         nv    SLR - flex  nv x10 5"x10 5"x10 5"x20 5"x20 5"x20 5"x20    Bridge  nv 5"x10 5"x10 5"x10 5"x20 5"x20 5"x20                  Ther Activity    bike 5 min 10 min 10 min 10 min 10 min  10 min 10 min 10 min 10 min    Step ups/downs         1R x10 ea                 Gait Training                              Modalities    CP PRN

## 2022-08-17 ENCOUNTER — OFFICE VISIT (OUTPATIENT)
Dept: PHYSICAL THERAPY | Facility: REHABILITATION | Age: 67
End: 2022-08-17
Payer: MEDICARE

## 2022-08-17 DIAGNOSIS — M25.561 RIGHT KNEE PAIN, UNSPECIFIED CHRONICITY: ICD-10-CM

## 2022-08-17 DIAGNOSIS — Z96.651 AFTERCARE FOLLOWING RIGHT KNEE JOINT REPLACEMENT SURGERY: Primary | ICD-10-CM

## 2022-08-17 DIAGNOSIS — Z96.651 STATUS POST TOTAL RIGHT KNEE REPLACEMENT: ICD-10-CM

## 2022-08-17 DIAGNOSIS — Z47.1 AFTERCARE FOLLOWING RIGHT KNEE JOINT REPLACEMENT SURGERY: Primary | ICD-10-CM

## 2022-08-17 PROCEDURE — 97112 NEUROMUSCULAR REEDUCATION: CPT

## 2022-08-17 PROCEDURE — 97110 THERAPEUTIC EXERCISES: CPT

## 2022-08-17 NOTE — PROGRESS NOTES
Daily Note     Today's date: 2022  Patient name: Amada Luke  : 1955  MRN: [id number]  Referring provider: Faustino Van MD  Dx:   Encounter Diagnosis     ICD-10-CM    1  Aftercare following right knee joint replacement surgery  Z47 1     Z96 651    2  Right knee pain, unspecified chronicity  M25 561    3  Status post total right knee replacement  Z96 651                   Subjective: Patient reports her knee is doing well today, no new complaints at this time  Objective: See treatment diary below      Assessment: Tolerated treatment well  Patient would benefit from continued PT      Plan: Continue per plan of care        Precautions: HTN  DOS 22    Daily Treatment Diary    Date 7/13 7/18 7/25 7/27 8/1 8/3 8/8 8/10 8/15 8/17   FOTO             Re-Eval                Manuals    PROM flex/ext TK TK CC TK MR TK TE CC TK PARDO   Patellar mobs TK TK CC TK MR TK TE CC TK    PROM hip                          Neuro Re-Ed     Tandem stance             SLS 30"x3 b/l 30"x3 b/l 30"x3 b/l 30"x3 b/l 30"x3 b/l 30"x3 b/l 30"x3 b/l 30"x3 b/l 30"x3 b/l 30"x3 b/l                                          Ther Ex                 Mini squats x20  x20 x20 x20 x20 x20 x20 2x15 2x15   Stand hip abd, ext 2# x20 ea 3# x20 ea 3# x20 ea 3# x20 ea 3# x20 ea 4# x20 ea b/l 4# x20 ea b/l 4# x20  ea b/l 4# x20  ea b/l 4# x20 ea b/l   Standing knee flex    3# x20 ea 3# x20 ea 4# x20 ea 4# x20 ea b/l 4# x20 ea b/l 4# x20  ea b/l 4# x20 ea b/l   Knee flex/ext stretch on step 10"x10 10"x10 10"x10 10"x10 10"x10 10"x10 10"x10 10"x10 10"x10 10"x10   Prone quad stretch  30"x3 30"x3 30"x3 np 30"x3 30"x3 30"x3 30"x3 np    Prone hang  2# 1x2' 2# 1x2' 2# 1x2' np 3# 2' 4# x2' 4# x2' 4#x2' np    Heel slides Supine 10"x10 np           SA knee ext         44# x10 44# x15   SA knee flex         nv    SLR - flex  nv x10 5"x10 5"x10 5"x20 5"x20 5"x20 5"x20 5"x20   Bridge  nv 5"x10 5"x10 5"x10 5"x20 5"x20 5"x20  5"x20                Ther Activity bike 5 min 10 min 10 min 10 min 10 min  10 min 10 min 10 min 10 min 10 min   Step ups/downs         1R x10 ea 1R x15 ea   Lat step ups          1R x10   Gait Training                              Modalities    CP PRN

## 2022-08-22 ENCOUNTER — APPOINTMENT (OUTPATIENT)
Dept: PHYSICAL THERAPY | Facility: REHABILITATION | Age: 67
End: 2022-08-22
Payer: MEDICARE

## 2022-08-24 ENCOUNTER — OFFICE VISIT (OUTPATIENT)
Dept: PHYSICAL THERAPY | Facility: REHABILITATION | Age: 67
End: 2022-08-24
Payer: MEDICARE

## 2022-08-24 DIAGNOSIS — Z96.651 STATUS POST TOTAL RIGHT KNEE REPLACEMENT: ICD-10-CM

## 2022-08-24 DIAGNOSIS — Z47.1 AFTERCARE FOLLOWING RIGHT KNEE JOINT REPLACEMENT SURGERY: Primary | ICD-10-CM

## 2022-08-24 DIAGNOSIS — M25.561 RIGHT KNEE PAIN, UNSPECIFIED CHRONICITY: ICD-10-CM

## 2022-08-24 DIAGNOSIS — Z96.651 AFTERCARE FOLLOWING RIGHT KNEE JOINT REPLACEMENT SURGERY: Primary | ICD-10-CM

## 2022-08-24 PROCEDURE — 97110 THERAPEUTIC EXERCISES: CPT

## 2022-08-24 PROCEDURE — 97140 MANUAL THERAPY 1/> REGIONS: CPT

## 2022-08-24 NOTE — PROGRESS NOTES
Daily Note     Today's date: 2022  Patient name: Colin Parker  : 1955  MRN: [id number]  Referring provider: Reine Gitelman, MD  Dx:   Encounter Diagnosis     ICD-10-CM    1  Aftercare following right knee joint replacement surgery  Z47 1     Z96 651    2  Right knee pain, unspecified chronicity  M25 561    3  Status post total right knee replacement  Z96 651                   Subjective: pt reports her lower back and R hip continues to be bothersome, but her knee is feeling pretty good overall  She noted has a scheduled f/u with MD for an injection for her lower back which she is hoping will provide pain relief  Objective: See treatment diary below      Assessment: Tolerated treatment well  Good tolerance with progressions with no adverse reactions  Pt exhibits good recall with current program  Patient demonstrated fatigue post treatment, exhibited good technique with therapeutic exercises and would benefit from continued PT      Plan: Continue per plan of care  Progress treatment as tolerated         Precautions: HTN  DOS 22    Daily Treatment Diary    Date 8/24  7/25 7/27 8/1 8/3 8/8 8/10 8/15 8/17   FOTO             Re-Eval                Manuals    PROM flex/ext TE  CC TK MR TK TE CC TK PARDO   Patellar mobs TE  CC TK MR TK TE CC TK    PROM hip                          Neuro Re-Ed     Tandem stance             SLS 30"x3 b/l  30"x3 b/l 30"x3 b/l 30"x3 b/l 30"x3 b/l 30"x3 b/l 30"x3 b/l 30"x3 b/l 30"x3 b/l                                          Ther Ex                 Mini squats x20  x20 x20 x20 x20 x20 x20 2x15 2x15   Stand hip abd, ext 4# x20 ea b/l  3# x20 ea 3# x20 ea 3# x20 ea 4# x20 ea b/l 4# x20 ea b/l 4# x20  ea b/l 4# x20  ea b/l 4# x20 ea b/l   Standing knee flex 4# x20 ea b/l   3# x20 ea 3# x20 ea 4# x20 ea 4# x20 ea b/l 4# x20 ea b/l 4# x20  ea b/l 4# x20 ea b/l   Knee flex/ext stretch on step 10"x10  10"x10 10"x10 10"x10 10"x10 10"x10 10"x10 10"x10 10"x10   Prone quad stretch    30"x3 np 30"x3 30"x3 30"x3 30"x3 np    Prone hang    2# 1x2' np 3# 2' 4# x2' 4# x2' 4#x2' np    Heel slides             SA knee ext 44# x6  33# x18        44# x10 44# x15   SA knee flex 44# 2x10        nv    SLR - flex   x10 5"x10 5"x10 5"x20 5"x20 5"x20 5"x20 5"x20   Bridge   5"x10 5"x10 5"x10 5"x20 5"x20 5"x20  5"x20                Ther Activity    bike 10 min  10 min 10 min 10 min  10 min 10 min 10 min 10 min 10 min   Step ups/downs         1R x10 ea 1R x15 ea   Lat step ups          1R x10   Gait Training                              Modalities    CP PRN ethan

## 2022-08-25 ENCOUNTER — OFFICE VISIT (OUTPATIENT)
Dept: PHYSICAL THERAPY | Facility: REHABILITATION | Age: 67
End: 2022-08-25
Payer: MEDICARE

## 2022-08-25 DIAGNOSIS — M25.561 RIGHT KNEE PAIN, UNSPECIFIED CHRONICITY: ICD-10-CM

## 2022-08-25 DIAGNOSIS — Z96.651 STATUS POST TOTAL RIGHT KNEE REPLACEMENT: ICD-10-CM

## 2022-08-25 DIAGNOSIS — Z96.651 AFTERCARE FOLLOWING RIGHT KNEE JOINT REPLACEMENT SURGERY: Primary | ICD-10-CM

## 2022-08-25 DIAGNOSIS — Z47.1 AFTERCARE FOLLOWING RIGHT KNEE JOINT REPLACEMENT SURGERY: Primary | ICD-10-CM

## 2022-08-25 PROCEDURE — 97110 THERAPEUTIC EXERCISES: CPT | Performed by: PHYSICAL MEDICINE & REHABILITATION

## 2022-08-25 PROCEDURE — 97112 NEUROMUSCULAR REEDUCATION: CPT | Performed by: PHYSICAL MEDICINE & REHABILITATION

## 2022-08-25 PROCEDURE — 97530 THERAPEUTIC ACTIVITIES: CPT | Performed by: PHYSICAL MEDICINE & REHABILITATION

## 2022-08-25 NOTE — PROGRESS NOTES
Daily Note     Today's date: 2022  Patient name: Benito Ross  : 1955  MRN: [id number]  Referring provider: Judith Gray MD  Dx:   Encounter Diagnosis     ICD-10-CM    1  Aftercare following right knee joint replacement surgery  Z47 1     Z96 651    2  Right knee pain, unspecified chronicity  M25 561    3  Status post total right knee replacement  Z96 651               Subjective: Patient offers no new complaints to begin session  Objective: See treatment diary below    Assessment: Tolerated treatment well  Patient most challenged today with SLS  Patient demonstrated fatigue post treatment, exhibited good technique with therapeutic exercises and would benefit from continued PT  Plan: Continue per plan of care  Progress treatment as tolerated         Precautions: HTN  DOS 22    Daily Treatment Diary    Date 8/24 8/25  7/27 8/1 8/3 8/8 8/10 8/15 8/17   FOTO             Re-Eval                Manuals    PROM flex/ext TE   TK MR TK TE CC TK PARDO   Patellar mobs TE   TK MR TK TE CC TK    PROM hip                          Neuro Re-Ed     Tandem stance             SLS 30"x3 b/l 3x30" ea  30"x3 b/l 30"x3 b/l 30"x3 b/l 30"x3 b/l 30"x3 b/l 30"x3 b/l 30"x3 b/l                                          Ther Ex                 Mini squats x20 20x  x20 x20 x20 x20 x20 2x15 2x15   Stand hip abd, ext 4# x20 ea b/l 4# 20x ea, B  3# x20 ea 3# x20 ea 4# x20 ea b/l 4# x20 ea b/l 4# x20  ea b/l 4# x20  ea b/l 4# x20 ea b/l   Standing knee flex 4# x20 ea b/l 4# 20x ea, B  3# x20 ea 3# x20 ea 4# x20 ea 4# x20 ea b/l 4# x20 ea b/l 4# x20  ea b/l 4# x20 ea b/l   Knee flex/ext stretch on step 10"x10 10x10"  10"x10 10"x10 10"x10 10"x10 10"x10 10"x10 10"x10   Prone quad stretch     np 30"x3 30"x3 30"x3 30"x3 np    Prone hang     np 3# 2' 4# x2' 4# x2' 4#x2' np    Heel slides             SA knee ext 44# x6  33# x18 33#  2x10       44# x10 44# x15   SA knee flex 44# 2x10 33# 2x10       nv    SLR - flex    5"x10 5"x10 5"x20 5"x20 5"x20 5"x20 5"x20   Bridge    5"x10 5"x10 5"x20 5"x20 5"x20  5"x20                Ther Activity    bike 10 min 10'  10 min 10 min  10 min 10 min 10 min 10 min 10 min   Step ups/downs         1R x10 ea 1R x15 ea   Lat step ups          1R x10   Gait Training                              Modalities    CP PRN

## 2022-08-29 ENCOUNTER — OFFICE VISIT (OUTPATIENT)
Dept: PHYSICAL THERAPY | Facility: REHABILITATION | Age: 67
End: 2022-08-29
Payer: MEDICARE

## 2022-08-29 DIAGNOSIS — Z96.651 AFTERCARE FOLLOWING RIGHT KNEE JOINT REPLACEMENT SURGERY: Primary | ICD-10-CM

## 2022-08-29 DIAGNOSIS — M25.561 RIGHT KNEE PAIN, UNSPECIFIED CHRONICITY: ICD-10-CM

## 2022-08-29 DIAGNOSIS — Z47.1 AFTERCARE FOLLOWING RIGHT KNEE JOINT REPLACEMENT SURGERY: Primary | ICD-10-CM

## 2022-08-29 DIAGNOSIS — Z96.651 STATUS POST TOTAL RIGHT KNEE REPLACEMENT: ICD-10-CM

## 2022-08-29 PROCEDURE — 97110 THERAPEUTIC EXERCISES: CPT

## 2022-08-29 PROCEDURE — 97140 MANUAL THERAPY 1/> REGIONS: CPT

## 2022-08-29 NOTE — PROGRESS NOTES
Daily Note     Today's date: 2022  Patient name: Jeannie Gannon  : 1955  MRN: [id number]  Referring provider: Maritza Black MD  Dx:   Encounter Diagnosis     ICD-10-CM    1  Aftercare following right knee joint replacement surgery  Z47 1     Z96 651    2  Right knee pain, unspecified chronicity  M25 561    3  Status post total right knee replacement  Z96 651                   Subjective: pt offered no new complaints  Objective: See treatment diary below      Assessment: Tolerated treatment well to and without complaints  PROM progressing nicely  Added leg press to further promote quad, hamstrings and glute strength with no adverse reaction  Patient demonstrated fatigue post treatment, exhibited good technique with therapeutic exercises and would benefit from continued PT      Plan: Continue per plan of care  Progress treatment as tolerated         Precautions: HTN  DOS 22    Daily Treatment Diary    Date 8/24 8/25 8/29  8/1 8/3 8/8 8/10 8/15 8/17   FOTO             Re-Eval                Manuals    PROM flex/ext TE  TE  MR TK TE CC TK PARDO   Patellar mobs TE  TE  MR TK TE CC TK    PROM hip                          Neuro Re-Ed     Tandem stance             SLS 30"x3 b/l 3x30" ea 3x30" ea  30"x3 b/l 30"x3 b/l 30"x3 b/l 30"x3 b/l 30"x3 b/l 30"x3 b/l                                          Ther Ex                 Mini squats x20 20x 20x  x20 x20 x20 x20 2x15 2x15   Stand hip abd, ext 4# x20 ea b/l 4# 20x ea, B 4# 20x ea, B  3# x20 ea 4# x20 ea b/l 4# x20 ea b/l 4# x20  ea b/l 4# x20  ea b/l 4# x20 ea b/l   Standing knee flex 4# x20 ea b/l 4# 20x ea, B 4# 20x ea, B  3# x20 ea 4# x20 ea 4# x20 ea b/l 4# x20 ea b/l 4# x20  ea b/l 4# x20 ea b/l   Knee flex/ext stretch on step 10"x10 10x10" 10"x10  10"x10 10"x10 10"x10 10"x10 10"x10 10"x10   Prone quad stretch    30"x5  30"x3 30"x3 30"x3 30"x3 np    Prone hang      3# 2' 4# x2' 4# x2' 4#x2' np    Heel slides             Leg press   44# 2x10          SA knee ext 44# x6  33# x18 33#  2x10 33#  2x10      44# x10 44# x15   SA knee flex 44# 2x10 33# 2x10 33#  2x10      nv    SLR - flex     5"x10 5"x20 5"x20 5"x20 5"x20 5"x20   Bridge     5"x10 5"x20 5"x20 5"x20  5"x20                Ther Activity    bike 10 min 10' 10'  10 min  10 min 10 min 10 min 10 min 10 min   Step ups/downs         1R x10 ea 1R x15 ea   Lat step ups          1R x10   Gait Training                              Modalities    CP PRN

## 2022-08-31 ENCOUNTER — OFFICE VISIT (OUTPATIENT)
Dept: PHYSICAL THERAPY | Facility: REHABILITATION | Age: 67
End: 2022-08-31
Payer: MEDICARE

## 2022-08-31 DIAGNOSIS — M25.561 RIGHT KNEE PAIN, UNSPECIFIED CHRONICITY: ICD-10-CM

## 2022-08-31 DIAGNOSIS — Z96.651 STATUS POST TOTAL RIGHT KNEE REPLACEMENT: ICD-10-CM

## 2022-08-31 DIAGNOSIS — Z96.651 AFTERCARE FOLLOWING RIGHT KNEE JOINT REPLACEMENT SURGERY: Primary | ICD-10-CM

## 2022-08-31 DIAGNOSIS — Z47.1 AFTERCARE FOLLOWING RIGHT KNEE JOINT REPLACEMENT SURGERY: Primary | ICD-10-CM

## 2022-08-31 PROCEDURE — 97110 THERAPEUTIC EXERCISES: CPT | Performed by: PHYSICAL THERAPIST

## 2022-08-31 PROCEDURE — 97112 NEUROMUSCULAR REEDUCATION: CPT | Performed by: PHYSICAL THERAPIST

## 2022-08-31 NOTE — PROGRESS NOTES
Daily Note     Today's date: 2022  Patient name: Jessica Colon  : 1955  MRN: [id number]  Referring provider: Zeina Norris MD  Dx:   Encounter Diagnosis     ICD-10-CM    1  Aftercare following right knee joint replacement surgery  Z47 1     Z96 651    2  Status post total right knee replacement  Z96 651    3  Right knee pain, unspecified chronicity  M25 561                   Subjective: Pt reports her knee has been doing better, however, notes her hip has been bothering her  Notes she is scheduled for an injection in her hip Friday  Objective: See treatment diary below      Assessment: Tolerated treatment well  Weights last visit on leg press and leg ext/flex machine remained the same due to difficulty last session  Patient exhibited good technique with therapeutic exercises and would benefit from continued PT      Plan: Progress treatment as tolerated         Precautions: HTN  DOS 22    Daily Treatment Diary    Date 8/24 8/25 8/29 8/31  8/3 8/8 8/10 8/15 8/17   FOTO             Re-Eval                Manuals    PROM flex/ext TE  TE   TK TE CC TK PARDO   Patellar mobs TE  TE   TK TE CC TK    PROM hip                          Neuro Re-Ed     Tandem stance             SLS 30"x3 b/l 3x30" ea 3x30" ea 3x30" ea  30"x3 b/l 30"x3 b/l 30"x3 b/l 30"x3 b/l 30"x3 b/l                                          Ther Ex                 Mini squats x20 20x 20x 2x15  x20 x20 x20 2x15 2x15   Stand hip abd, ext 4# x20 ea b/l 4# 20x ea, B 4# 20x ea, B 4# 20x ea, B  4# x20 ea b/l 4# x20 ea b/l 4# x20  ea b/l 4# x20  ea b/l 4# x20 ea b/l   Standing knee flex 4# x20 ea b/l 4# 20x ea, B 4# 20x ea, B 4# 20x ea, B  4# x20 ea 4# x20 ea b/l 4# x20 ea b/l 4# x20  ea b/l 4# x20 ea b/l   Knee flex/ext stretch on step 10"x10 10x10" 10"x10 10"x10  10"x10 10"x10 10"x10 10"x10 10"x10   Prone quad stretch    30"x5 30"x5  30"x3 30"x3 30"x3 np    Prone hang       4# x2' 4# x2' 4#x2' np    Heel slides             Leg press   44# 2x10 44# 2x10         SA knee ext 44# x6  33# x18 33#  2x10 33#  2x10 33#  2x10     44# x10 44# x15   SA knee flex 44# 2x10 33# 2x10 33#  2x10 33#  2x10     nv    SLR - flex      5"x20 5"x20 5"x20 5"x20 5"x20   Bridge      5"x20 5"x20 5"x20  5"x20                Ther Activity    bike 10 min 10' 10' 10'  10 min 10 min 10 min 10 min 10 min   Step ups/downs         1R x10 ea 1R x15 ea   Lat step ups          1R x10   Gait Training                              Modalities    CP PRN

## 2022-12-07 ENCOUNTER — OFFICE VISIT (OUTPATIENT)
Dept: URGENT CARE | Age: 67
End: 2022-12-07

## 2022-12-07 VITALS
HEART RATE: 71 BPM | DIASTOLIC BLOOD PRESSURE: 90 MMHG | RESPIRATION RATE: 18 BRPM | SYSTOLIC BLOOD PRESSURE: 144 MMHG | OXYGEN SATURATION: 99 % | TEMPERATURE: 98.5 F

## 2022-12-07 DIAGNOSIS — H60.501 ACUTE OTITIS EXTERNA OF RIGHT EAR, UNSPECIFIED TYPE: ICD-10-CM

## 2022-12-07 DIAGNOSIS — H65.91 OTHER NONSUPPURATIVE OTITIS MEDIA OF RIGHT EAR, UNSPECIFIED CHRONICITY: Primary | ICD-10-CM

## 2022-12-07 RX ORDER — OXYCODONE HYDROCHLORIDE 10 MG/1
10 TABLET ORAL EVERY 8 HOURS PRN
COMMUNITY
Start: 2022-11-18 | End: 2022-12-18

## 2022-12-07 RX ORDER — ATORVASTATIN CALCIUM 20 MG/1
20 TABLET, FILM COATED ORAL DAILY
COMMUNITY
Start: 2022-05-10 | End: 2023-05-10

## 2022-12-07 RX ORDER — OFLOXACIN 3 MG/ML
10 SOLUTION AURICULAR (OTIC) DAILY
Qty: 5 ML | Refills: 0 | Status: SHIPPED | OUTPATIENT
Start: 2022-12-07

## 2022-12-07 RX ORDER — MORPHINE SULFATE 15 MG/1
TABLET, FILM COATED, EXTENDED RELEASE ORAL
COMMUNITY
Start: 2022-11-08

## 2022-12-07 RX ORDER — VENLAFAXINE HYDROCHLORIDE 75 MG/1
75 CAPSULE, EXTENDED RELEASE ORAL DAILY
COMMUNITY
Start: 2022-11-15

## 2022-12-07 RX ORDER — AMOXICILLIN AND CLAVULANATE POTASSIUM 875; 125 MG/1; MG/1
1 TABLET, FILM COATED ORAL EVERY 12 HOURS SCHEDULED
Qty: 14 TABLET | Refills: 0 | Status: SHIPPED | OUTPATIENT
Start: 2022-12-07 | End: 2022-12-14

## 2022-12-07 RX ORDER — AMOXICILLIN 500 MG/1
TABLET, FILM COATED ORAL
COMMUNITY
Start: 2022-10-27 | End: 2022-12-07

## 2022-12-07 NOTE — PROGRESS NOTES
NAME: Romy Sanabria is a 74 y o  female  : 1955    MRN: [id number]    /90   Pulse 71   Temp 98 5 °F (36 9 °C)   Resp 18   SpO2 99%     Assessment and Plan   Other nonsuppurative otitis media of right ear, unspecified chronicity [H65 91]  1  Other nonsuppurative otitis media of right ear, unspecified chronicity  amoxicillin-clavulanate (AUGMENTIN) 875-125 mg per tablet      2  Acute otitis externa of right ear, unspecified type  ofloxacin (FLOXIN) 0 3 % otic solution          Nataly Alvarenga was seen today for earache  Diagnoses and all orders for this visit:    Other nonsuppurative otitis media of right ear, unspecified chronicity  -     amoxicillin-clavulanate (AUGMENTIN) 875-125 mg per tablet; Take 1 tablet by mouth every 12 (twelve) hours for 7 days    Acute otitis externa of right ear, unspecified type  -     ofloxacin (FLOXIN) 0 3 % otic solution; Administer 10 drops to the right ear daily        Patient Instructions   Patient Instructions   Take meds as directed  Follow up with pcp   If worse go to the ER    Follow up with ENT  Discussed how to use drops and aware of inner and outer ear infection     Tylenol and motrin for pain and fever      Proceed to the nearest ER if symptoms worsen, Follow up with your PCP  Continue to social distance, wash your hands, and wear your masks  Please continue to follow the CDC  gov guidelines daily for they are subject to change on COVID-19    Chief Complaint     Chief Complaint   Patient presents with   • Earache     Right ear drainage starting Monday  Intermittent pain         History of Present Illness     73 Yo F here today for right ear pain and drainge from the right ear, present for the past few days and wrosening, no fevers, no radiation of symptoms, prulent fluid in the right ear canal noted      Review of Systems   Review of Systems   Constitutional: Negative  HENT: Positive for ear pain (right ear pain)  Negative for congestion      Respiratory: Negative  Cardiovascular: Negative  Gastrointestinal: Negative  Current Medications       Current Outpatient Medications:   •  amoxicillin-clavulanate (AUGMENTIN) 875-125 mg per tablet, Take 1 tablet by mouth every 12 (twelve) hours for 7 days, Disp: 14 tablet, Rfl: 0  •  atorvastatin (LIPITOR) 20 mg tablet, Take 20 mg by mouth daily, Disp: , Rfl:   •  Cholecalciferol 125 MCG (5000 UT) TABS, Take 1 tablet by mouth 2 (two) times a day, Disp: , Rfl:   •  gabapentin (NEURONTIN) 600 MG tablet, Take 600 mg by mouth 3 (three) times a day, Disp: , Rfl:   •  losartan-hydrochlorothiazide (HYZAAR) 100-12 5 MG per tablet, Take 1 tablet by mouth daily, Disp: , Rfl:   •  metFORMIN (GLUCOPHAGE-XR) 500 mg 24 hr tablet, Take 500 mg by mouth 2 (two) times a day with meals, Disp: , Rfl:   •  metoprolol succinate (TOPROL-XL) 50 mg 24 hr tablet, , Disp: , Rfl:   •  morphine (MS CONTIN) 15 mg 12 hr tablet, TAKE 1 TABLET (15 MG TOTAL) BY MOUTH 2 (TWO) TIMES A DAY  MAX DAILY AMOUNT: 30 MG, Disp: , Rfl:   •  ofloxacin (FLOXIN) 0 3 % otic solution, Administer 10 drops to the right ear daily, Disp: 5 mL, Rfl: 0  •  oxyCODONE (ROXICODONE) 10 MG TABS, Take 10 mg by mouth every 8 (eight) hours as needed, Disp: , Rfl:   •  venlafaxine (EFFEXOR-XR) 75 mg 24 hr capsule, Take 75 mg by mouth daily, Disp: , Rfl:     Current Allergies     Allergies as of 12/07/2022 - Reviewed 12/07/2022   Allergen Reaction Noted   • Bee venom Other (See Comments) 06/09/2022              Past Medical History:   Diagnosis Date   • Hypertension        Past Surgical History:   Procedure Laterality Date   • JOINT REPLACEMENT Right     knee       History reviewed  No pertinent family history  Medications have been verified      The following portions of the patient's history were reviewed and updated as appropriate: allergies, current medications, past family history, past medical history, past social history, past surgical history and problem list     Objective   /90   Pulse 71   Temp 98 5 °F (36 9 °C)   Resp 18   SpO2 99%      Physical Exam     Physical Exam  Constitutional:       Appearance: Normal appearance  She is not ill-appearing  HENT:      Right Ear: Hearing normal  Drainage and tenderness present  A middle ear effusion is present  There is no impacted cerumen  Tympanic membrane is erythematous and bulging  Left Ear: Tympanic membrane, ear canal and external ear normal  There is no impacted cerumen  Nose: Nose normal       Mouth/Throat:      Lips: Pink  Mouth: Mucous membranes are moist       Pharynx: Oropharynx is clear  No pharyngeal swelling, oropharyngeal exudate, posterior oropharyngeal erythema or uvula swelling  Neurological:      Mental Status: She is alert  Note: Portions of this record may have been created with voice recognition software  Occasional wrong word or "sound a like" substitutions may have occurred due to the inherent limitations of voice recognition software  Please read the chart carefully and recognize, using context, where substitutions have occurred  MARJORIE Pastrana

## 2022-12-07 NOTE — PATIENT INSTRUCTIONS
Take meds as directed  Follow up with pcp   If worse go to the ER    Follow up with ENT  Discussed how to use drops and aware of inner and outer ear infection     Tylenol and motrin for pain and fever